# Patient Record
Sex: FEMALE | Race: WHITE | Employment: OTHER | URBAN - NONMETROPOLITAN AREA
[De-identification: names, ages, dates, MRNs, and addresses within clinical notes are randomized per-mention and may not be internally consistent; named-entity substitution may affect disease eponyms.]

---

## 2021-08-13 ENCOUNTER — HOSPITAL ENCOUNTER (INPATIENT)
Age: 61
LOS: 5 days | Discharge: HOME | DRG: 177 | End: 2021-08-18
Attending: EMERGENCY MEDICINE | Admitting: HOSPITALIST
Payer: COMMERCIAL

## 2021-08-13 ENCOUNTER — APPOINTMENT (OUTPATIENT)
Dept: GENERAL RADIOLOGY | Age: 61
DRG: 177 | End: 2021-08-13
Payer: COMMERCIAL

## 2021-08-13 DIAGNOSIS — R09.02 HYPOXIA: Primary | ICD-10-CM

## 2021-08-13 DIAGNOSIS — U07.1 COVID-19: ICD-10-CM

## 2021-08-13 PROBLEM — J96.01 ACUTE HYPOXEMIC RESPIRATORY FAILURE DUE TO SEVERE ACUTE RESPIRATORY SYNDROME CORONAVIRUS 2 (SARS-COV-2) DISEASE (HCC): Status: ACTIVE | Noted: 2021-08-13

## 2021-08-13 PROBLEM — E11.9 DM (DIABETES MELLITUS) (HCC): Status: ACTIVE | Noted: 2021-08-13

## 2021-08-13 PROBLEM — I10 HTN (HYPERTENSION): Status: ACTIVE | Noted: 2021-08-13

## 2021-08-13 LAB
ALBUMIN SERPL-MCNC: 3.9 G/DL (ref 3.5–5.2)
ALP BLD-CCNC: 91 U/L (ref 35–104)
ALT SERPL-CCNC: 55 U/L (ref 5–33)
ANION GAP SERPL CALCULATED.3IONS-SCNC: 12 MMOL/L (ref 7–19)
AST SERPL-CCNC: 75 U/L (ref 5–32)
BASOPHILS ABSOLUTE: 0 K/UL (ref 0–0.2)
BASOPHILS RELATIVE PERCENT: 0.2 % (ref 0–1)
BILIRUB SERPL-MCNC: 0.5 MG/DL (ref 0.2–1.2)
BUN BLDV-MCNC: 22 MG/DL (ref 8–23)
C-REACTIVE PROTEIN: 7.14 MG/DL (ref 0–0.5)
CALCIUM SERPL-MCNC: 9 MG/DL (ref 8.8–10.2)
CHLORIDE BLD-SCNC: 102 MMOL/L (ref 98–111)
CO2: 26 MMOL/L (ref 22–29)
CREAT SERPL-MCNC: 0.9 MG/DL (ref 0.5–0.9)
D DIMER: 0.8 UG/ML FEU (ref 0–0.48)
EOSINOPHILS ABSOLUTE: 0 K/UL (ref 0–0.6)
EOSINOPHILS RELATIVE PERCENT: 0 % (ref 0–5)
FERRITIN: >2000 NG/ML (ref 13–150)
FIBRINOGEN: 583 MG/DL (ref 238–505)
GFR AFRICAN AMERICAN: >59
GFR NON-AFRICAN AMERICAN: >60
GLUCOSE BLD-MCNC: 151 MG/DL (ref 74–109)
HBA1C MFR BLD: 6.7 % (ref 4–6)
HCT VFR BLD CALC: 42.5 % (ref 37–47)
HEMOGLOBIN: 13.3 G/DL (ref 12–16)
IMMATURE GRANULOCYTES #: 0 K/UL
INR BLD: 0.92 (ref 0.88–1.18)
LYMPHOCYTES ABSOLUTE: 1.3 K/UL (ref 1.1–4.5)
LYMPHOCYTES RELATIVE PERCENT: 20.9 % (ref 20–40)
MCH RBC QN AUTO: 28.9 PG (ref 27–31)
MCHC RBC AUTO-ENTMCNC: 31.3 G/DL (ref 33–37)
MCV RBC AUTO: 92.4 FL (ref 81–99)
MONOCYTES ABSOLUTE: 0.4 K/UL (ref 0–0.9)
MONOCYTES RELATIVE PERCENT: 6.2 % (ref 0–10)
NEUTROPHILS ABSOLUTE: 4.4 K/UL (ref 1.5–7.5)
NEUTROPHILS RELATIVE PERCENT: 72.4 % (ref 50–65)
PDW BLD-RTO: 12.9 % (ref 11.5–14.5)
PLATELET # BLD: 174 K/UL (ref 130–400)
PMV BLD AUTO: 11 FL (ref 9.4–12.3)
POTASSIUM REFLEX MAGNESIUM: 4.2 MMOL/L (ref 3.5–5)
PROCALCITONIN: 0.18 NG/ML (ref 0–0.09)
PROTHROMBIN TIME: 12.6 SEC (ref 12–14.6)
RBC # BLD: 4.6 M/UL (ref 4.2–5.4)
SARS-COV-2, NAAT: DETECTED
SODIUM BLD-SCNC: 140 MMOL/L (ref 136–145)
TOTAL PROTEIN: 7.4 G/DL (ref 6.6–8.7)
TROPONIN: <0.01 NG/ML (ref 0–0.03)
WBC # BLD: 6.1 K/UL (ref 4.8–10.8)

## 2021-08-13 PROCEDURE — 2700000000 HC OXYGEN THERAPY PER DAY

## 2021-08-13 PROCEDURE — 1210000000 HC MED SURG R&B

## 2021-08-13 PROCEDURE — 6370000000 HC RX 637 (ALT 250 FOR IP): Performed by: HOSPITALIST

## 2021-08-13 PROCEDURE — 87635 SARS-COV-2 COVID-19 AMP PRB: CPT

## 2021-08-13 PROCEDURE — 85025 COMPLETE CBC W/AUTO DIFF WBC: CPT

## 2021-08-13 PROCEDURE — 71045 X-RAY EXAM CHEST 1 VIEW: CPT

## 2021-08-13 PROCEDURE — 83036 HEMOGLOBIN GLYCOSYLATED A1C: CPT

## 2021-08-13 PROCEDURE — 85610 PROTHROMBIN TIME: CPT

## 2021-08-13 PROCEDURE — 82728 ASSAY OF FERRITIN: CPT

## 2021-08-13 PROCEDURE — 93005 ELECTROCARDIOGRAM TRACING: CPT | Performed by: EMERGENCY MEDICINE

## 2021-08-13 PROCEDURE — 80053 COMPREHEN METABOLIC PANEL: CPT

## 2021-08-13 PROCEDURE — 84145 PROCALCITONIN (PCT): CPT

## 2021-08-13 PROCEDURE — 85379 FIBRIN DEGRADATION QUANT: CPT

## 2021-08-13 PROCEDURE — 86140 C-REACTIVE PROTEIN: CPT

## 2021-08-13 PROCEDURE — 2580000003 HC RX 258: Performed by: HOSPITALIST

## 2021-08-13 PROCEDURE — 99283 EMERGENCY DEPT VISIT LOW MDM: CPT

## 2021-08-13 PROCEDURE — 84484 ASSAY OF TROPONIN QUANT: CPT

## 2021-08-13 PROCEDURE — 6360000002 HC RX W HCPCS: Performed by: HOSPITALIST

## 2021-08-13 PROCEDURE — 36415 COLL VENOUS BLD VENIPUNCTURE: CPT

## 2021-08-13 PROCEDURE — 6370000000 HC RX 637 (ALT 250 FOR IP): Performed by: NURSE PRACTITIONER

## 2021-08-13 PROCEDURE — 85384 FIBRINOGEN ACTIVITY: CPT

## 2021-08-13 RX ORDER — MECOBALAMIN 5000 MCG
5 TABLET,DISINTEGRATING ORAL NIGHTLY
Status: DISCONTINUED | OUTPATIENT
Start: 2021-08-13 | End: 2021-08-18 | Stop reason: HOSPADM

## 2021-08-13 RX ORDER — SODIUM CHLORIDE 0.9 % (FLUSH) 0.9 %
5-40 SYRINGE (ML) INJECTION EVERY 12 HOURS SCHEDULED
Status: DISCONTINUED | OUTPATIENT
Start: 2021-08-13 | End: 2021-08-18 | Stop reason: HOSPADM

## 2021-08-13 RX ORDER — ALBUTEROL SULFATE 90 UG/1
2 AEROSOL, METERED RESPIRATORY (INHALATION) EVERY 6 HOURS PRN
Status: DISCONTINUED | OUTPATIENT
Start: 2021-08-13 | End: 2021-08-18 | Stop reason: HOSPADM

## 2021-08-13 RX ORDER — VITAMIN B COMPLEX
2000 TABLET ORAL DAILY
Status: DISCONTINUED | OUTPATIENT
Start: 2021-08-14 | End: 2021-08-18 | Stop reason: HOSPADM

## 2021-08-13 RX ORDER — ASCORBIC ACID 500 MG
500 TABLET ORAL DAILY
Status: DISCONTINUED | OUTPATIENT
Start: 2021-08-14 | End: 2021-08-18 | Stop reason: HOSPADM

## 2021-08-13 RX ORDER — ONDANSETRON 2 MG/ML
4 INJECTION INTRAMUSCULAR; INTRAVENOUS EVERY 6 HOURS PRN
Status: DISCONTINUED | OUTPATIENT
Start: 2021-08-13 | End: 2021-08-18 | Stop reason: HOSPADM

## 2021-08-13 RX ORDER — ONDANSETRON 4 MG/1
4 TABLET, ORALLY DISINTEGRATING ORAL EVERY 8 HOURS PRN
Status: DISCONTINUED | OUTPATIENT
Start: 2021-08-13 | End: 2021-08-18 | Stop reason: HOSPADM

## 2021-08-13 RX ORDER — GUAIFENESIN/DEXTROMETHORPHAN 100-10MG/5
5 SYRUP ORAL EVERY 4 HOURS PRN
Status: DISCONTINUED | OUTPATIENT
Start: 2021-08-13 | End: 2021-08-18 | Stop reason: HOSPADM

## 2021-08-13 RX ORDER — 0.9 % SODIUM CHLORIDE 0.9 %
30 INTRAVENOUS SOLUTION INTRAVENOUS PRN
Status: DISCONTINUED | OUTPATIENT
Start: 2021-08-13 | End: 2021-08-18 | Stop reason: HOSPADM

## 2021-08-13 RX ORDER — SODIUM CHLORIDE 9 MG/ML
25 INJECTION, SOLUTION INTRAVENOUS PRN
Status: DISCONTINUED | OUTPATIENT
Start: 2021-08-13 | End: 2021-08-18 | Stop reason: HOSPADM

## 2021-08-13 RX ORDER — MECOBALAMIN 5000 MCG
5 TABLET,DISINTEGRATING ORAL NIGHTLY PRN
Status: DISCONTINUED | OUTPATIENT
Start: 2021-08-13 | End: 2021-08-18 | Stop reason: HOSPADM

## 2021-08-13 RX ORDER — FAMOTIDINE 20 MG/1
20 TABLET, FILM COATED ORAL 2 TIMES DAILY
Status: DISCONTINUED | OUTPATIENT
Start: 2021-08-13 | End: 2021-08-18 | Stop reason: HOSPADM

## 2021-08-13 RX ORDER — BUDESONIDE AND FORMOTEROL FUMARATE DIHYDRATE 160; 4.5 UG/1; UG/1
2 AEROSOL RESPIRATORY (INHALATION) 2 TIMES DAILY
Status: DISCONTINUED | OUTPATIENT
Start: 2021-08-13 | End: 2021-08-18 | Stop reason: HOSPADM

## 2021-08-13 RX ORDER — POLYETHYLENE GLYCOL 3350 17 G/17G
17 POWDER, FOR SOLUTION ORAL DAILY PRN
Status: DISCONTINUED | OUTPATIENT
Start: 2021-08-13 | End: 2021-08-18 | Stop reason: HOSPADM

## 2021-08-13 RX ORDER — ACETAMINOPHEN 650 MG/1
650 SUPPOSITORY RECTAL EVERY 6 HOURS PRN
Status: DISCONTINUED | OUTPATIENT
Start: 2021-08-13 | End: 2021-08-18 | Stop reason: HOSPADM

## 2021-08-13 RX ORDER — ACETAMINOPHEN 325 MG/1
650 TABLET ORAL EVERY 6 HOURS PRN
Status: DISCONTINUED | OUTPATIENT
Start: 2021-08-13 | End: 2021-08-18 | Stop reason: HOSPADM

## 2021-08-13 RX ORDER — SODIUM CHLORIDE 0.9 % (FLUSH) 0.9 %
5-40 SYRINGE (ML) INJECTION PRN
Status: DISCONTINUED | OUTPATIENT
Start: 2021-08-13 | End: 2021-08-18 | Stop reason: HOSPADM

## 2021-08-13 RX ORDER — CALCIUM CARBONATE 200(500)MG
500 TABLET,CHEWABLE ORAL 3 TIMES DAILY PRN
Status: DISCONTINUED | OUTPATIENT
Start: 2021-08-13 | End: 2021-08-18 | Stop reason: HOSPADM

## 2021-08-13 RX ORDER — PANTOPRAZOLE SODIUM 40 MG/1
40 TABLET, DELAYED RELEASE ORAL
Status: DISCONTINUED | OUTPATIENT
Start: 2021-08-14 | End: 2021-08-18 | Stop reason: HOSPADM

## 2021-08-13 RX ORDER — ZINC SULFATE 50(220)MG
50 CAPSULE ORAL DAILY
Status: DISCONTINUED | OUTPATIENT
Start: 2021-08-14 | End: 2021-08-18 | Stop reason: HOSPADM

## 2021-08-13 RX ADMIN — FAMOTIDINE 20 MG: 20 TABLET ORAL at 23:35

## 2021-08-13 RX ADMIN — SODIUM CHLORIDE, PRESERVATIVE FREE 10 ML: 5 INJECTION INTRAVENOUS at 23:37

## 2021-08-13 RX ADMIN — DEXAMETHASONE 6 MG: 4 TABLET ORAL at 23:35

## 2021-08-13 RX ADMIN — BUDESONIDE AND FORMOTEROL FUMARATE DIHYDRATE 2 PUFF: 160; 4.5 AEROSOL RESPIRATORY (INHALATION) at 23:36

## 2021-08-13 RX ADMIN — ENOXAPARIN SODIUM 30 MG: 30 INJECTION SUBCUTANEOUS at 23:36

## 2021-08-13 RX ADMIN — Medication 5 MG: at 23:35

## 2021-08-13 ASSESSMENT — ENCOUNTER SYMPTOMS
VOMITING: 0
SHORTNESS OF BREATH: 1
DIARRHEA: 0
WHEEZING: 0
SORE THROAT: 0
ABDOMINAL PAIN: 0
ABDOMINAL DISTENTION: 0
CONSTIPATION: 0
COUGH: 1
TROUBLE SWALLOWING: 0
SINUS PAIN: 0
NAUSEA: 1
BLOOD IN STOOL: 0

## 2021-08-14 ENCOUNTER — APPOINTMENT (OUTPATIENT)
Dept: CT IMAGING | Age: 61
DRG: 177 | End: 2021-08-14
Payer: COMMERCIAL

## 2021-08-14 PROBLEM — Z87.891 FORMER HEAVY CIGARETTE SMOKER (20-39 PER DAY): Status: ACTIVE | Noted: 2021-08-14

## 2021-08-14 LAB
ALBUMIN SERPL-MCNC: 3.8 G/DL (ref 3.5–5.2)
ALP BLD-CCNC: 89 U/L (ref 35–104)
ALT SERPL-CCNC: 54 U/L (ref 5–33)
ANION GAP SERPL CALCULATED.3IONS-SCNC: 13 MMOL/L (ref 7–19)
AST SERPL-CCNC: 71 U/L (ref 5–32)
BACTERIA: ABNORMAL /HPF
BASOPHILS ABSOLUTE: 0 K/UL (ref 0–0.2)
BASOPHILS RELATIVE PERCENT: 0 % (ref 0–1)
BILIRUB SERPL-MCNC: 0.5 MG/DL (ref 0.2–1.2)
BILIRUBIN URINE: NEGATIVE
BLOOD, URINE: ABNORMAL
BUN BLDV-MCNC: 22 MG/DL (ref 8–23)
C-REACTIVE PROTEIN: 6.49 MG/DL (ref 0–0.5)
CALCIUM SERPL-MCNC: 8.8 MG/DL (ref 8.8–10.2)
CHLORIDE BLD-SCNC: 102 MMOL/L (ref 98–111)
CLARITY: CLEAR
CO2: 24 MMOL/L (ref 22–29)
COLOR: YELLOW
CREAT SERPL-MCNC: 0.9 MG/DL (ref 0.5–0.9)
EKG P AXIS: 24 DEGREES
EKG P-R INTERVAL: 160 MS
EKG Q-T INTERVAL: 362 MS
EKG QRS DURATION: 78 MS
EKG QTC CALCULATION (BAZETT): 393 MS
EKG T AXIS: 38 DEGREES
EOSINOPHILS ABSOLUTE: 0 K/UL (ref 0–0.6)
EOSINOPHILS RELATIVE PERCENT: 0 % (ref 0–5)
EPITHELIAL CELLS, UA: 3 /HPF (ref 0–5)
GFR AFRICAN AMERICAN: >59
GFR NON-AFRICAN AMERICAN: >60
GLUCOSE BLD-MCNC: 198 MG/DL (ref 74–109)
GLUCOSE BLD-MCNC: 214 MG/DL (ref 70–99)
GLUCOSE BLD-MCNC: 255 MG/DL (ref 70–99)
GLUCOSE BLD-MCNC: 257 MG/DL (ref 70–99)
GLUCOSE URINE: =>1000 MG/DL
HCT VFR BLD CALC: 40.3 % (ref 37–47)
HEMOGLOBIN: 12.7 G/DL (ref 12–16)
HYALINE CASTS: 3 /HPF (ref 0–8)
IMMATURE GRANULOCYTES #: 0 K/UL
KETONES, URINE: NEGATIVE MG/DL
LEUKOCYTE ESTERASE, URINE: ABNORMAL
LYMPHOCYTES ABSOLUTE: 1 K/UL (ref 1.1–4.5)
LYMPHOCYTES RELATIVE PERCENT: 15.1 % (ref 20–40)
MCH RBC QN AUTO: 29 PG (ref 27–31)
MCHC RBC AUTO-ENTMCNC: 31.5 G/DL (ref 33–37)
MCV RBC AUTO: 92 FL (ref 81–99)
MONOCYTES ABSOLUTE: 0.2 K/UL (ref 0–0.9)
MONOCYTES RELATIVE PERCENT: 3.7 % (ref 0–10)
NEUTROPHILS ABSOLUTE: 5.1 K/UL (ref 1.5–7.5)
NEUTROPHILS RELATIVE PERCENT: 80.7 % (ref 50–65)
NITRITE, URINE: NEGATIVE
PDW BLD-RTO: 12.9 % (ref 11.5–14.5)
PERFORMED ON: ABNORMAL
PH UA: 5 (ref 5–8)
PLATELET # BLD: 169 K/UL (ref 130–400)
PMV BLD AUTO: 11 FL (ref 9.4–12.3)
POTASSIUM REFLEX MAGNESIUM: 5 MMOL/L (ref 3.5–5)
PROTEIN UA: 100 MG/DL
RBC # BLD: 4.38 M/UL (ref 4.2–5.4)
RBC UA: 4 /HPF (ref 0–4)
SODIUM BLD-SCNC: 139 MMOL/L (ref 136–145)
SPECIFIC GRAVITY UA: >=1.045 (ref 1–1.03)
TOTAL PROTEIN: 6.5 G/DL (ref 6.6–8.7)
UROBILINOGEN, URINE: 1 E.U./DL
WBC # BLD: 6.3 K/UL (ref 4.8–10.8)
WBC UA: 112 /HPF (ref 0–5)

## 2021-08-14 PROCEDURE — 6370000000 HC RX 637 (ALT 250 FOR IP): Performed by: NURSE PRACTITIONER

## 2021-08-14 PROCEDURE — 6360000002 HC RX W HCPCS: Performed by: HOSPITALIST

## 2021-08-14 PROCEDURE — 2580000003 HC RX 258: Performed by: NURSE PRACTITIONER

## 2021-08-14 PROCEDURE — 2700000000 HC OXYGEN THERAPY PER DAY

## 2021-08-14 PROCEDURE — 1210000000 HC MED SURG R&B

## 2021-08-14 PROCEDURE — 6370000000 HC RX 637 (ALT 250 FOR IP): Performed by: INTERNAL MEDICINE

## 2021-08-14 PROCEDURE — 36415 COLL VENOUS BLD VENIPUNCTURE: CPT

## 2021-08-14 PROCEDURE — 81001 URINALYSIS AUTO W/SCOPE: CPT

## 2021-08-14 PROCEDURE — 2580000003 HC RX 258: Performed by: HOSPITALIST

## 2021-08-14 PROCEDURE — 6360000004 HC RX CONTRAST MEDICATION: Performed by: INTERNAL MEDICINE

## 2021-08-14 PROCEDURE — 82947 ASSAY GLUCOSE BLOOD QUANT: CPT

## 2021-08-14 PROCEDURE — XW033E5 INTRODUCTION OF REMDESIVIR ANTI-INFECTIVE INTO PERIPHERAL VEIN, PERCUTANEOUS APPROACH, NEW TECHNOLOGY GROUP 5: ICD-10-PCS | Performed by: HOSPITALIST

## 2021-08-14 PROCEDURE — 6370000000 HC RX 637 (ALT 250 FOR IP): Performed by: HOSPITALIST

## 2021-08-14 PROCEDURE — 71275 CT ANGIOGRAPHY CHEST: CPT

## 2021-08-14 PROCEDURE — 86140 C-REACTIVE PROTEIN: CPT

## 2021-08-14 PROCEDURE — 93010 ELECTROCARDIOGRAM REPORT: CPT | Performed by: INTERNAL MEDICINE

## 2021-08-14 PROCEDURE — 80053 COMPREHEN METABOLIC PANEL: CPT

## 2021-08-14 PROCEDURE — 87086 URINE CULTURE/COLONY COUNT: CPT

## 2021-08-14 PROCEDURE — 85025 COMPLETE CBC W/AUTO DIFF WBC: CPT

## 2021-08-14 PROCEDURE — 2500000003 HC RX 250 WO HCPCS: Performed by: NURSE PRACTITIONER

## 2021-08-14 PROCEDURE — 87186 SC STD MICRODIL/AGAR DIL: CPT

## 2021-08-14 RX ORDER — LISINOPRIL 40 MG/1
40 TABLET ORAL DAILY
COMMUNITY

## 2021-08-14 RX ORDER — PRAVASTATIN SODIUM 40 MG
40 TABLET ORAL DAILY
COMMUNITY

## 2021-08-14 RX ORDER — INSULIN GLARGINE 100 [IU]/ML
10 INJECTION, SOLUTION SUBCUTANEOUS NIGHTLY
Status: DISCONTINUED | OUTPATIENT
Start: 2021-08-14 | End: 2021-08-15

## 2021-08-14 RX ORDER — NICOTINE POLACRILEX 4 MG
15 LOZENGE BUCCAL PRN
Status: DISCONTINUED | OUTPATIENT
Start: 2021-08-14 | End: 2021-08-18 | Stop reason: HOSPADM

## 2021-08-14 RX ORDER — GLIPIZIDE 5 MG/1
5 TABLET ORAL DAILY
COMMUNITY

## 2021-08-14 RX ORDER — DEXTROSE MONOHYDRATE 50 MG/ML
100 INJECTION, SOLUTION INTRAVENOUS PRN
Status: DISCONTINUED | OUTPATIENT
Start: 2021-08-14 | End: 2021-08-18 | Stop reason: HOSPADM

## 2021-08-14 RX ORDER — LISINOPRIL 10 MG/1
10 TABLET ORAL DAILY
Status: DISCONTINUED | OUTPATIENT
Start: 2021-08-14 | End: 2021-08-16

## 2021-08-14 RX ORDER — DEXTROSE MONOHYDRATE 25 G/50ML
12.5 INJECTION, SOLUTION INTRAVENOUS PRN
Status: DISCONTINUED | OUTPATIENT
Start: 2021-08-14 | End: 2021-08-18 | Stop reason: HOSPADM

## 2021-08-14 RX ADMIN — ZINC SULFATE 220 MG (50 MG) CAPSULE 50 MG: CAPSULE at 08:27

## 2021-08-14 RX ADMIN — PANTOPRAZOLE SODIUM 40 MG: 40 TABLET, DELAYED RELEASE ORAL at 08:28

## 2021-08-14 RX ADMIN — FAMOTIDINE 20 MG: 20 TABLET ORAL at 22:59

## 2021-08-14 RX ADMIN — OXYCODONE HYDROCHLORIDE AND ACETAMINOPHEN 500 MG: 500 TABLET ORAL at 08:27

## 2021-08-14 RX ADMIN — FAMOTIDINE 20 MG: 20 TABLET ORAL at 08:27

## 2021-08-14 RX ADMIN — LISINOPRIL 10 MG: 2.5 TABLET ORAL at 12:09

## 2021-08-14 RX ADMIN — ENOXAPARIN SODIUM 30 MG: 30 INJECTION SUBCUTANEOUS at 08:28

## 2021-08-14 RX ADMIN — SODIUM CHLORIDE, PRESERVATIVE FREE 10 ML: 5 INJECTION INTRAVENOUS at 08:28

## 2021-08-14 RX ADMIN — BUDESONIDE AND FORMOTEROL FUMARATE DIHYDRATE 2 PUFF: 160; 4.5 AEROSOL RESPIRATORY (INHALATION) at 08:25

## 2021-08-14 RX ADMIN — GUAIFENESIN AND DEXTROMETHORPHAN 5 ML: 100; 10 SYRUP ORAL at 08:35

## 2021-08-14 RX ADMIN — DEXAMETHASONE 6 MG: 4 TABLET ORAL at 16:54

## 2021-08-14 RX ADMIN — Medication 5 MG: at 23:00

## 2021-08-14 RX ADMIN — Medication 2000 UNITS: at 08:27

## 2021-08-14 RX ADMIN — ENOXAPARIN SODIUM 30 MG: 30 INJECTION SUBCUTANEOUS at 23:00

## 2021-08-14 RX ADMIN — BUDESONIDE AND FORMOTEROL FUMARATE DIHYDRATE 2 PUFF: 160; 4.5 AEROSOL RESPIRATORY (INHALATION) at 22:59

## 2021-08-14 RX ADMIN — SODIUM CHLORIDE, PRESERVATIVE FREE 10 ML: 5 INJECTION INTRAVENOUS at 22:59

## 2021-08-14 RX ADMIN — INSULIN GLARGINE 10 UNITS: 100 INJECTION, SOLUTION SUBCUTANEOUS at 23:06

## 2021-08-14 RX ADMIN — IOPAMIDOL 95 ML: 755 INJECTION, SOLUTION INTRAVENOUS at 09:14

## 2021-08-14 RX ADMIN — REMDESIVIR 200 MG: 100 INJECTION, POWDER, LYOPHILIZED, FOR SOLUTION INTRAVENOUS at 04:35

## 2021-08-14 ASSESSMENT — ENCOUNTER SYMPTOMS
ABDOMINAL PAIN: 0
COUGH: 1
VOMITING: 0
SHORTNESS OF BREATH: 1
COLOR CHANGE: 0
CHEST TIGHTNESS: 0
EYE PAIN: 0
DIARRHEA: 0
WHEEZING: 0
NAUSEA: 1

## 2021-08-14 NOTE — PROGRESS NOTES
[facility]      Patient:  Thom Bryan  YOB: 1960  Date of Service: 8/14/2021  MRN: [id number]   Acct: [de-identified]   Primary Care Physician: Se Gayle MD  Advance Directive: Full Code  Admit Date: 8/13/2021       Hospital Day: 1  Portions of this note have been copied forward, however, changed to reflect the most current clinical status of this patient. CHIEF COMPLAINT Shortness of breath, known Covid diagnosis    SUBJECTIVE:  Patient states she is feeling much better today. Reports improvement in shortness of breath. Denies chest pain. HOSPITAL COURSE:  The patient is a 56 y.o. female with past medical history of hypertension and diabetes who presented to [address] [place] [address] ED complaining of shortness of breath associated with COVID-19. Pt stated she tested positive for Covid on 8/4/2021. Reported developing shortness of breath 3 days prior to admission. Reported fever, chills, nonproductive cough, nausea, fatigue, weakness and decreased appetite. She was noted to be hypoxic in ED requiring 3 L oxygen per nasal cannula, denied home oxygen use. Work-up in ED revealed hypoxia on arrival, CRP of 7.14, ALT 55, AST 75, D-dimer 0.80, fibrinogen 583. Chest x-ray revealed patchy bilateral pneumonia consistent with Covid pneumonia. CTA negative for PE. Currently requiring 2 LPM via nasal cannula. Review of Systems:   Review of Systems   Constitutional: Positive for activity change, appetite change and fatigue. Negative for chills and fever. Respiratory: Positive for cough and shortness of breath. Negative for chest tightness and wheezing. Cardiovascular: Negative for chest pain and leg swelling. Gastrointestinal: Positive for nausea. Negative for abdominal pain and vomiting. Genitourinary: Negative for difficulty urinating and dysuria. Musculoskeletal: Negative for arthralgias and myalgias. Skin: Negative for color change. Neurological: Negative for dizziness and light-headedness. Psychiatric/Behavioral: Negative for confusion. 14 point review of systems is negative except as specifically addressed above. Objective:   VITALS:  BP (!) 168/77   Pulse 65   Temp 97.3 °F (36.3 °C)   Resp 16   Ht 5' 4\" (1.626 m)   Wt 154 lb 3 oz (69.9 kg)   SpO2 95%   BMI 26.47 kg/m²   24HR INTAKE/OUTPUT:    Intake/Output Summary (Last 24 hours) at 8/14/2021 1427  Last data filed at 8/14/2021 0030  Gross per 24 hour   Intake 200 ml   Output --   Net 200 ml       Physical Exam  Eyes:      Extraocular Movements: Extraocular movements intact. Pupils: Pupils are equal, round, and reactive to light. Cardiovascular:      Rate and Rhythm: Normal rate and regular rhythm. Pulses: Normal pulses. Heart sounds: Normal heart sounds. Pulmonary:      Effort: Pulmonary effort is normal.      Breath sounds: Wheezing present. Abdominal:      General: Abdomen is flat. Bowel sounds are normal. There is no distension. Palpations: Abdomen is soft. Tenderness: There is no abdominal tenderness. Musculoskeletal:         General: Normal range of motion. Cervical back: Normal range of motion and neck supple. Right lower leg: No edema. Left lower leg: No edema. Skin:     General: Skin is warm and dry. Capillary Refill: Capillary refill takes less than 2 seconds. Neurological:      General: No focal deficit present. Mental Status: She is alert and oriented to person, place, and time.          Medications:      dextrose      sodium chloride        lisinopril  10 mg Oral Daily    insulin lispro  0-6 Units Subcutaneous TID WC    insulin lispro  0-3 Units Subcutaneous Nightly    insulin glargine  10 Units Subcutaneous Nightly    sodium chloride flush  5-40 mL Intravenous 2 times per day    enoxaparin  30 mg Subcutaneous BID    dexamethasone  6 mg Oral Daily    Vitamin D  2,000 Units Oral Daily    pantoprazole  40 mg Oral QAM AC    ascorbic acid  500 mg Oral Daily    budesonide-formoterol  2 puff Inhalation BID    famotidine  20 mg Oral BID    melatonin  5 mg Oral Nightly    zinc sulfate  50 mg Oral Daily    [START ON 8/15/2021] remdesivir IVPB  100 mg Intravenous Q24H     glucose, dextrose, glucagon (rDNA), dextrose, sodium chloride flush, sodium chloride, ondansetron **OR** ondansetron, acetaminophen **OR** acetaminophen, guaiFENesin-dextromethorphan, polyethylene glycol, melatonin, calcium carbonate, albuterol sulfate HFA, sodium chloride  ADULT DIET; Regular     Lab and other Data:     Recent Labs     08/13/21 2036 08/14/21  0346   WBC 6.1 6.3   HGB 13.3 12.7    169     Recent Labs     08/13/21 2036 08/14/21 0346    139   K 4.2 5.0    102   CO2 26 24   BUN 22 22   CREATININE 0.9 0.9   GLUCOSE 151* 198*     Recent Labs     08/13/21 2036 08/14/21 0346   AST 75* 71*   ALT 55* 54*   BILITOT 0.5 0.5   ALKPHOS 91 89     Troponin T:   Recent Labs     08/13/21 2036   TROPONINI <0.01     Pro-BNP: No results for input(s): BNP in the last 72 hours. INR:   Recent Labs     08/13/21 2036   INR 0.92     UA:No results for input(s): NITRITE, COLORU, PHUR, LABCAST, WBCUA, RBCUA, MUCUS, TRICHOMONAS, YEAST, BACTERIA, CLARITYU, SPECGRAV, LEUKOCYTESUR, UROBILINOGEN, BILIRUBINUR, BLOODU, GLUCOSEU, AMORPHOUS in the last 72 hours. Invalid input(s): Sinan Brine  A1C:   Recent Labs     08/13/21 2036   LABA1C 6.7*     ABG:No results for input(s): PHART, GFU3YQS, PO2ART, IHE0BXY, BEART, HGBAE, S9ONZBXF, CARBOXHGBART in the last 72 hours. RAD:   XR CHEST PORTABLE    Result Date: 8/13/2021  . Patchy bilateral pneumonia. Signed by Dr Kathy Benitez    Result Date: 8/14/2021  1. No pulmonary emboli. 2. Scattered bilateral predominantly peripheral groundglass opacities consistent with multifocal pneumonia. The appearance is favorable COVID-19 pneumonia. 3. Mild cardiomegaly with scattered vascular calcification.  Signed by

## 2021-08-14 NOTE — ED PROVIDER NOTES
file.      CURRENT MEDICATIONS       There are no discharge medications for this patient. ALLERGIES     Patient has no known allergies. FAMILY HISTORY     No family history on file. SOCIAL HISTORY       Social History     Socioeconomic History    Marital status:      Spouse name: None    Number of children: None    Years of education: None    Highest education level: None   Occupational History    None   Tobacco Use    Smoking status: Unknown If Ever Smoked   Vaping Use    Vaping Use: Never used   Substance and Sexual Activity    Alcohol use: Not Currently    Drug use: None    Sexual activity: None   Other Topics Concern    None   Social History Narrative    None     Social Determinants of Health     Financial Resource Strain:     Difficulty of Paying Living Expenses:    Food Insecurity:     Worried About Running Out of Food in the Last Year:     Ran Out of Food in the Last Year:    Transportation Needs:     Lack of Transportation (Medical):      Lack of Transportation (Non-Medical):    Physical Activity:     Days of Exercise per Week:     Minutes of Exercise per Session:    Stress:     Feeling of Stress :    Social Connections:     Frequency of Communication with Friends and Family:     Frequency of Social Gatherings with Friends and Family:     Attends Mandaeism Services:     Active Member of Clubs or Organizations:     Attends Club or Organization Meetings:     Marital Status:    Intimate Partner Violence:     Fear of Current or Ex-Partner:     Emotionally Abused:     Physically Abused:     Sexually Abused:        SCREENINGS    Tanner Coma Scale  Eye Opening: Spontaneous  Best Verbal Response: Oriented  Best Motor Response: Obeys commands  Tanner Coma Scale Score: 15        PHYSICAL EXAM    (up to 7 for level 4, 8 or more for level 5)     ED Triage Vitals [08/13/21 1939]   BP Temp Temp src Pulse Resp SpO2 Height Weight   124/74 99.3 °F (37.4 °C) -- 108 18 (!) 88 % -- 140 lb (63.5 kg)       Physical Exam  Vitals reviewed. Constitutional:       General: She is not in acute distress. Appearance: She is well-developed. HENT:      Head: Normocephalic and atraumatic. Mouth/Throat:      Mouth: Mucous membranes are moist.   Eyes:      General: No scleral icterus. Pupils: Pupils are equal, round, and reactive to light. Neck:      Vascular: No JVD. Cardiovascular:      Rate and Rhythm: Normal rate and regular rhythm. Heart sounds: Normal heart sounds. Pulmonary:      Effort: Pulmonary effort is normal. No respiratory distress. Breath sounds: Rhonchi present. Abdominal:      General: There is no distension. Palpations: Abdomen is soft. Tenderness: There is no abdominal tenderness. Musculoskeletal:         General: No tenderness. Cervical back: Normal range of motion and neck supple. Right lower leg: No edema. Left lower leg: No edema. Skin:     General: Skin is warm and dry. Capillary Refill: Capillary refill takes less than 2 seconds. Neurological:      General: No focal deficit present. Mental Status: She is alert and oriented to person, place, and time. Psychiatric:         Mood and Affect: Mood normal.         Behavior: Behavior normal.         DIAGNOSTIC RESULTS     EKG: All EKG's are interpreted by the Emergency Department Physician who either signs or Co-signs this chart in the absence of a cardiologist.    Sinus rhythm. Normal QT. No signs of acute ischemia    RADIOLOGY:   Non-plain film images such as CT, Ultrasound and MRI are read by the radiologist. Plainradiographic images are visualized and preliminarily interpreted by the emergency physician with the below findings:    Interpretation per the Radiologist below, if available at the time of this note:    CTA PULMONARY W CONTRAST   Final Result   1. No pulmonary emboli.    2. Scattered bilateral predominantly peripheral groundglass opacities consistent with multifocal pneumonia. The appearance is favorable   COVID-19 pneumonia. 3. Mild cardiomegaly with scattered vascular calcification. Signed by Dr Luzmaria Frye   Final Result   . Patchy bilateral pneumonia.    Signed by Dr Jefferson Johnson:  Labs Reviewed   COVID-19, RAPID - Abnormal; Notable for the following components:       Result Value    SARS-CoV-2, NAAT DETECTED (*)     All other components within normal limits    Narrative:     Ira Chambers tel. ,  Microbiology results called to and read back by ilan sun rn er,  08/13/2021 21:02, by [address] - Abnormal; Notable for the following components:    MCHC 31.3 (*)     Neutrophils % 72.4 (*)     All other components within normal limits   COMPREHENSIVE METABOLIC PANEL W/ REFLEX TO MG FOR LOW K - Abnormal; Notable for the following components:    Glucose 151 (*)     ALT 55 (*)     AST 75 (*)     All other components within normal limits   FIBRINOGEN - Abnormal; Notable for the following components:    Fibrinogen 583 (*)     All other components within normal limits   PROCALCITONIN - Abnormal; Notable for the following components:    Procalcitonin 0.18 (*)     All other components within normal limits   C-REACTIVE PROTEIN - Abnormal; Notable for the following components:    CRP 7.14 (*)     All other components within normal limits   FERRITIN - Abnormal; Notable for the following components:    Ferritin >2,000.0 (*)     All other components within normal limits   D-DIMER, QUANTITATIVE - Abnormal; Notable for the following components:    D-Dimer, Quant 0.80 (*)     All other components within normal limits   HEMOGLOBIN A1C - Abnormal; Notable for the following components:    Hemoglobin A1C 6.7 (*)     All other components within normal limits   CBC WITH AUTO DIFFERENTIAL - Abnormal; Notable for the following components:    MCHC 31.5 (*)     Neutrophils % 80.7 (*)     Lymphocytes % 15.1 (*)     Lymphocytes Absolute 1.0 (*)     All other components within normal limits   COMPREHENSIVE METABOLIC PANEL W/ REFLEX TO MG FOR LOW K - Abnormal; Notable for the following components:    Glucose 198 (*)     Total Protein 6.5 (*)     ALT 54 (*)     AST 71 (*)     All other components within normal limits   C-REACTIVE PROTEIN - Abnormal; Notable for the following components:    CRP 6.49 (*)     All other components within normal limits   POCT GLUCOSE - Abnormal; Notable for the following components:    POC Glucose 255 (*)     All other components within normal limits   PROTIME-INR   TROPONIN   URINE RT REFLEX TO CULTURE   POCT GLUCOSE   POCT GLUCOSE   POCT GLUCOSE       All other labs were within normal range or not returned as of this dictation. EMERGENCY DEPARTMENT COURSE and DIFFERENTIALDIAGNOSIS/MDM:   Vitals:    Vitals:    08/13/21 2200 08/14/21 0030 08/14/21 0430 08/14/21 0722   BP: (!) 167/67 (!) 149/56 (!) 145/72 (!) 168/77   Pulse: 80 78 73 65   Resp: 16 18 18 16   Temp: 98.9 °F (37.2 °C) 98.9 °F (37.2 °C) 96.7 °F (35.9 °C) 97.3 °F (36.3 °C)   SpO2: 96% 94% 94% 95%   Weight: 154 lb 3 oz (69.9 kg) 154 lb 3 oz (69.9 kg)     Height: 5' 4\" (1.626 m)          MDM  O2 sats mid 80s on room air. Improved to 90s on nasal cannula. Patient resting comfortably no distress. Patient's case discussed with Dr. Elena Dai, hospitalist, who will admit. Patient updated about plan. CONSULTS:  IP CONSULT TO PHARMACY    PROCEDURES:  Unless otherwise notedbelow, none     Procedures    FINAL IMPRESSION     1. Hypoxia    2. COVID-19          DISPOSITION/PLAN   DISPOSITION Admitted 08/13/2021 08:06:46 PM      PATIENT REFERRED TO:  @FUP@    DISCHARGE MEDICATIONS:  There are no discharge medications for this patient.          (Please note that portions of this note were completed with a voice recognition program.  Efforts were made to edit the dictations butoccasionally words are mis-transcribed.)    Hany De Los Santos Dov Cleary MD (electronically signed)  AttendingEmergency Physician         Tonie Stewart MD  08/14/21 7714

## 2021-08-14 NOTE — H&P
[facility] Hospitalists      Hospitalist - History & Physical      PCP: Issa Lafleur MD    Date of Admission: 8/13/2021    Date of Service: 8/13/2021    Chief Complaint: Positive for Covid, shortness of breath    History Of Present Illness: The patient is a 56 y.o. female with past medical history of hypertension and diabetes who presented to [facility] ED complaining of shortness of breath associated with COVID-19. Ms. Xiomara Mckinnon states she was tested positive for Covid on 8/4/2021. Started developing shortness of breath since last 3 days. Reports fever, chills, nonproductive cough, nausea, fatigue, weakness and decreased appetite. She was noted to be hypoxic in ED requiring 3 L oxygen per nasal cannula, denies home oxygen use. Work-up in ED revealed hypoxia on arrival, CRP of 7.14, ALT 55, AST 75, D-dimer 0.80, fibrinogen 583. Chest x-ray revealed patchy bilateral pneumonia consistent with Covid pneumonia. Past Medical History:        Diagnosis Date    DM (diabetes mellitus) ([address]     HTN (hypertension)        Past Surgical History:    No past surgical history on file. Home Medications:  Prior to Admission medications    Not on File       Allergies:    Patient has no known allergies. Social History:    The patient currently lives at home  Tobacco:   has no history on file for tobacco use. Alcohol:   has no history on file for alcohol use. Illicit Drugs: denies    Family History:  No family history on file. Review of Systems   Constitutional: Positive for appetite change, chills, fatigue and fever. Negative for diaphoresis. HENT: Negative for ear pain, sinus pain, sore throat and trouble swallowing. Eyes: Negative for visual disturbance. Respiratory: Positive for cough and shortness of breath. Negative for wheezing. Cardiovascular: Negative for chest pain, palpitations and leg swelling. Gastrointestinal: Positive for nausea.  Negative for abdominal distention, abdominal pain, blood in stool, constipation, diarrhea and vomiting. Endocrine: Negative for cold intolerance and heat intolerance. Genitourinary: Negative for difficulty urinating, flank pain, frequency and urgency. Musculoskeletal: Negative for arthralgias and myalgias. Neurological: Positive for weakness. Negative for dizziness, syncope, light-headedness, numbness and headaches. Hematological: Does not bruise/bleed easily. Psychiatric/Behavioral: Negative for agitation, confusion and dysphoric mood. Physical Examination:  /76   Pulse 98   Temp 99.3 °F (37.4 °C)   Resp 20   Wt 140 lb (63.5 kg)   SpO2 94%     Physical Exam  Constitutional:       General: She is not in acute distress. Appearance: Normal appearance. She is not toxic-appearing or diaphoretic. HENT:      Head: Normocephalic and atraumatic. Right Ear: External ear normal.      Left Ear: External ear normal.      Nose: Nose normal. No congestion or rhinorrhea. Mouth/Throat:      Mouth: Mucous membranes are moist.      Pharynx: Oropharynx is clear. Eyes:      General: No scleral icterus. Extraocular Movements: Extraocular movements intact. Conjunctiva/sclera: Conjunctivae normal.   Cardiovascular:      Rate and Rhythm: Normal rate and regular rhythm. Pulses: Normal pulses. Heart sounds: Normal heart sounds. No murmur heard. No friction rub. No gallop. Pulmonary:      Effort: Pulmonary effort is normal. No respiratory distress. Breath sounds: No wheezing, rhonchi or rales. Comments: Diminished breath sounds bilaterally  Abdominal:      General: Abdomen is flat. Bowel sounds are normal. There is no distension. Palpations: Abdomen is soft. Tenderness: There is no abdominal tenderness. Musculoskeletal:         General: No swelling. Normal range of motion. Cervical back: Normal range of motion and neck supple. Right lower leg: No edema. Left lower leg: No edema. (hypertension)- noted, resume home medications once verified       DM (diabetes mellitus) ([facility]- HgA1c, SSI, Accu-Chek, hypoglycemia treatment protocol in place        DVT Prophylaxis: Lovenox    Further Orders per Clinical course/attending. Signed:  Electronically signed by PHILIP Choudhary CNP on 8/13/21 at 8:43 PM CDT       EMR Dragon/Transcription disclaimer:   Much of this encounter note is an electronic transcription/translation of spoken language to printed text.  The electronic translation of spoken language may permit erroneous, or at times, nonsensical words or phrases to be inadvertently transcribed; although attempts have made to review the note for such errors, some may still exist.

## 2021-08-15 LAB
ALBUMIN SERPL-MCNC: 3.6 G/DL (ref 3.5–5.2)
ALP BLD-CCNC: 82 U/L (ref 35–104)
ALT SERPL-CCNC: 54 U/L (ref 5–33)
ANION GAP SERPL CALCULATED.3IONS-SCNC: 10 MMOL/L (ref 7–19)
AST SERPL-CCNC: 53 U/L (ref 5–32)
BASOPHILS ABSOLUTE: 0 K/UL (ref 0–0.2)
BASOPHILS RELATIVE PERCENT: 0 % (ref 0–1)
BILIRUB SERPL-MCNC: 0.3 MG/DL (ref 0.2–1.2)
BUN BLDV-MCNC: 34 MG/DL (ref 8–23)
C-REACTIVE PROTEIN: 4.41 MG/DL (ref 0–0.5)
CALCIUM SERPL-MCNC: 8.9 MG/DL (ref 8.8–10.2)
CHLORIDE BLD-SCNC: 102 MMOL/L (ref 98–111)
CO2: 28 MMOL/L (ref 22–29)
CREAT SERPL-MCNC: 0.9 MG/DL (ref 0.5–0.9)
EOSINOPHILS ABSOLUTE: 0 K/UL (ref 0–0.6)
EOSINOPHILS RELATIVE PERCENT: 0 % (ref 0–5)
GFR AFRICAN AMERICAN: >59
GFR NON-AFRICAN AMERICAN: >60
GLUCOSE BLD-MCNC: 166 MG/DL (ref 70–99)
GLUCOSE BLD-MCNC: 168 MG/DL (ref 70–99)
GLUCOSE BLD-MCNC: 276 MG/DL (ref 70–99)
GLUCOSE BLD-MCNC: 297 MG/DL (ref 74–109)
GLUCOSE BLD-MCNC: 304 MG/DL (ref 70–99)
HCT VFR BLD CALC: 39.7 % (ref 37–47)
HEMOGLOBIN: 12.6 G/DL (ref 12–16)
IMMATURE GRANULOCYTES #: 0 K/UL
LYMPHOCYTES ABSOLUTE: 1 K/UL (ref 1.1–4.5)
LYMPHOCYTES RELATIVE PERCENT: 24 % (ref 20–40)
MCH RBC QN AUTO: 28.8 PG (ref 27–31)
MCHC RBC AUTO-ENTMCNC: 31.7 G/DL (ref 33–37)
MCV RBC AUTO: 90.8 FL (ref 81–99)
MONOCYTES ABSOLUTE: 0.2 K/UL (ref 0–0.9)
MONOCYTES RELATIVE PERCENT: 5 % (ref 0–10)
NEUTROPHILS ABSOLUTE: 2.9 K/UL (ref 1.5–7.5)
NEUTROPHILS RELATIVE PERCENT: 70.5 % (ref 50–65)
PDW BLD-RTO: 12.8 % (ref 11.5–14.5)
PERFORMED ON: ABNORMAL
PLATELET # BLD: 198 K/UL (ref 130–400)
PMV BLD AUTO: 11.2 FL (ref 9.4–12.3)
POTASSIUM REFLEX MAGNESIUM: 5.4 MMOL/L (ref 3.5–5)
PROCALCITONIN: 0.12 NG/ML (ref 0–0.09)
RBC # BLD: 4.37 M/UL (ref 4.2–5.4)
SODIUM BLD-SCNC: 140 MMOL/L (ref 136–145)
TOTAL PROTEIN: 6.4 G/DL (ref 6.6–8.7)
WBC # BLD: 4.2 K/UL (ref 4.8–10.8)

## 2021-08-15 PROCEDURE — 86140 C-REACTIVE PROTEIN: CPT

## 2021-08-15 PROCEDURE — 6360000002 HC RX W HCPCS: Performed by: HOSPITALIST

## 2021-08-15 PROCEDURE — 2580000003 HC RX 258: Performed by: HOSPITALIST

## 2021-08-15 PROCEDURE — 1210000000 HC MED SURG R&B

## 2021-08-15 PROCEDURE — 6360000002 HC RX W HCPCS: Performed by: INTERNAL MEDICINE

## 2021-08-15 PROCEDURE — 2580000003 HC RX 258: Performed by: NURSE PRACTITIONER

## 2021-08-15 PROCEDURE — 6370000000 HC RX 637 (ALT 250 FOR IP): Performed by: NURSE PRACTITIONER

## 2021-08-15 PROCEDURE — 6370000000 HC RX 637 (ALT 250 FOR IP): Performed by: INTERNAL MEDICINE

## 2021-08-15 PROCEDURE — 84145 PROCALCITONIN (PCT): CPT

## 2021-08-15 PROCEDURE — 82947 ASSAY GLUCOSE BLOOD QUANT: CPT

## 2021-08-15 PROCEDURE — 2500000003 HC RX 250 WO HCPCS: Performed by: NURSE PRACTITIONER

## 2021-08-15 PROCEDURE — 85025 COMPLETE CBC W/AUTO DIFF WBC: CPT

## 2021-08-15 PROCEDURE — 6370000000 HC RX 637 (ALT 250 FOR IP): Performed by: HOSPITALIST

## 2021-08-15 PROCEDURE — 2580000003 HC RX 258: Performed by: INTERNAL MEDICINE

## 2021-08-15 PROCEDURE — 36415 COLL VENOUS BLD VENIPUNCTURE: CPT

## 2021-08-15 PROCEDURE — 80053 COMPREHEN METABOLIC PANEL: CPT

## 2021-08-15 PROCEDURE — 2700000000 HC OXYGEN THERAPY PER DAY

## 2021-08-15 RX ORDER — INSULIN GLARGINE 100 [IU]/ML
15 INJECTION, SOLUTION SUBCUTANEOUS NIGHTLY
Status: DISCONTINUED | OUTPATIENT
Start: 2021-08-15 | End: 2021-08-16

## 2021-08-15 RX ORDER — SODIUM CHLORIDE 9 MG/ML
INJECTION, SOLUTION INTRAVENOUS CONTINUOUS
Status: DISCONTINUED | OUTPATIENT
Start: 2021-08-15 | End: 2021-08-18 | Stop reason: HOSPADM

## 2021-08-15 RX ORDER — SODIUM POLYSTYRENE SULFONATE 15 G/60ML
15 SUSPENSION ORAL; RECTAL EVERY 6 HOURS
Status: COMPLETED | OUTPATIENT
Start: 2021-08-15 | End: 2021-08-15

## 2021-08-15 RX ADMIN — OXYCODONE HYDROCHLORIDE AND ACETAMINOPHEN 500 MG: 500 TABLET ORAL at 09:12

## 2021-08-15 RX ADMIN — BUDESONIDE AND FORMOTEROL FUMARATE DIHYDRATE 2 PUFF: 160; 4.5 AEROSOL RESPIRATORY (INHALATION) at 09:11

## 2021-08-15 RX ADMIN — SODIUM CHLORIDE: 9 INJECTION, SOLUTION INTRAVENOUS at 18:08

## 2021-08-15 RX ADMIN — FAMOTIDINE 20 MG: 20 TABLET ORAL at 20:43

## 2021-08-15 RX ADMIN — SODIUM CHLORIDE: 9 INJECTION, SOLUTION INTRAVENOUS at 09:16

## 2021-08-15 RX ADMIN — SODIUM CHLORIDE, PRESERVATIVE FREE 10 ML: 5 INJECTION INTRAVENOUS at 20:42

## 2021-08-15 RX ADMIN — INSULIN GLARGINE 15 UNITS: 100 INJECTION, SOLUTION SUBCUTANEOUS at 20:43

## 2021-08-15 RX ADMIN — ENOXAPARIN SODIUM 30 MG: 30 INJECTION SUBCUTANEOUS at 20:43

## 2021-08-15 RX ADMIN — SODIUM POLYSTYRENE SULFONATE 15 G: 15 SUSPENSION ORAL; RECTAL at 14:25

## 2021-08-15 RX ADMIN — SODIUM CHLORIDE, PRESERVATIVE FREE 1000 MG: 5 INJECTION INTRAVENOUS at 09:12

## 2021-08-15 RX ADMIN — LISINOPRIL 10 MG: 2.5 TABLET ORAL at 09:12

## 2021-08-15 RX ADMIN — REMDESIVIR 100 MG: 100 INJECTION, POWDER, LYOPHILIZED, FOR SOLUTION INTRAVENOUS at 06:37

## 2021-08-15 RX ADMIN — SODIUM CHLORIDE, PRESERVATIVE FREE 10 ML: 5 INJECTION INTRAVENOUS at 09:11

## 2021-08-15 RX ADMIN — Medication 2000 UNITS: at 09:12

## 2021-08-15 RX ADMIN — SODIUM CHLORIDE: 9 INJECTION, SOLUTION INTRAVENOUS at 20:42

## 2021-08-15 RX ADMIN — Medication 5 MG: at 20:43

## 2021-08-15 RX ADMIN — DEXAMETHASONE 6 MG: 4 TABLET ORAL at 17:35

## 2021-08-15 RX ADMIN — BUDESONIDE AND FORMOTEROL FUMARATE DIHYDRATE 2 PUFF: 160; 4.5 AEROSOL RESPIRATORY (INHALATION) at 20:43

## 2021-08-15 RX ADMIN — FAMOTIDINE 20 MG: 20 TABLET ORAL at 09:14

## 2021-08-15 RX ADMIN — ENOXAPARIN SODIUM 30 MG: 30 INJECTION SUBCUTANEOUS at 09:12

## 2021-08-15 RX ADMIN — SODIUM POLYSTYRENE SULFONATE 15 G: 15 SUSPENSION ORAL; RECTAL at 09:13

## 2021-08-15 RX ADMIN — PANTOPRAZOLE SODIUM 40 MG: 40 TABLET, DELAYED RELEASE ORAL at 06:38

## 2021-08-15 RX ADMIN — ZINC SULFATE 220 MG (50 MG) CAPSULE 50 MG: CAPSULE at 09:12

## 2021-08-15 ASSESSMENT — ENCOUNTER SYMPTOMS
WHEEZING: 0
ABDOMINAL PAIN: 0
CHEST TIGHTNESS: 0
VOMITING: 0
COUGH: 1
NAUSEA: 0
SHORTNESS OF BREATH: 1
COLOR CHANGE: 0

## 2021-08-15 NOTE — PROGRESS NOTES
[facility] Hospitalists      Patient:  Jumana Wyatt  YOB: 1960  Date of Service: 8/15/2021  MRN: [id number]   Acct: [de-identified]   Primary Care Physician: Veronica Edmondson MD  Advance Directive: Full Code  Admit Date: 8/13/2021       Hospital Day: 2  Portions of this note have been copied forward, however, changed to reflect the most current clinical status of this patient. CHIEF COMPLAINT shortness of breath, known covid    SUBJECTIVE:  Pt states she is feeling much better today. Reports shortness of breath has improved. States she has been able to get up the the chair today. HOSPITAL COURSE:  The patient is a 57 y. o. female with past medical history of hypertension and diabetes who presented to [facility] ED complaining of shortness of breath associated with COVID-19.  Pt stated she tested positive for Covid on 8/4/2021.  Reported developing shortness of breath 3 days prior to admission.  Reported fever, chills, nonproductive cough, nausea, fatigue, weakness and decreased appetite.  She was noted to be hypoxic in ED requiring 3 L oxygen per nasal cannula, denied home oxygen use.  Work-up in ED revealed hypoxia on arrival, CRP of 7.14, ALT 55, AST 75, D-dimer 0.80, fibrinogen 583.  Chest x-ray revealed patchy bilateral pneumonia consistent with Covid pneumonia. CTA negative for PE. Requiring 2 LPM via nasal cannula. Receiving remdesivir and decadron. No new complaints. Review of Systems:   Review of Systems   Constitutional: Positive for fatigue. Negative for activity change, appetite change, chills and fever. Respiratory: Positive for cough and shortness of breath. Negative for chest tightness and wheezing. Cardiovascular: Negative for chest pain and leg swelling. Gastrointestinal: Negative for abdominal pain, nausea and vomiting. Genitourinary: Negative for difficulty urinating and dysuria. Musculoskeletal: Negative for arthralgias and myalgias. Skin: Negative for color change. Neurological: Negative for dizziness and light-headedness. Psychiatric/Behavioral: Negative for confusion. 14 point review of systems is negative except as specifically addressed above. Objective:   VITALS:  BP (!) 156/66   Pulse 68   Temp 96.8 °F (36 °C) (Temporal)   Resp 18   Ht 5' 4\" (1.626 m)   Wt 154 lb 3 oz (69.9 kg)   SpO2 92%   BMI 26.47 kg/m²   24HR INTAKE/OUTPUT:    Intake/Output Summary (Last 24 hours) at 8/15/2021 1341  Last data filed at 8/15/2021 1100  Gross per 24 hour   Intake 120 ml   Output --   Net 120 ml       Physical Exam  Constitutional:       Appearance: Normal appearance. Eyes:      Extraocular Movements: Extraocular movements intact. Pupils: Pupils are equal, round, and reactive to light. Cardiovascular:      Rate and Rhythm: Normal rate and regular rhythm. Pulses: Normal pulses. Heart sounds: Normal heart sounds. Pulmonary:      Effort: Pulmonary effort is normal.      Breath sounds: No wheezing. Abdominal:      General: Abdomen is flat. Bowel sounds are normal. There is no distension. Palpations: Abdomen is soft. Tenderness: There is no abdominal tenderness. Musculoskeletal:         General: Normal range of motion. Cervical back: Normal range of motion and neck supple. Right lower leg: No edema. Left lower leg: No edema. Skin:     General: Skin is warm and dry. Capillary Refill: Capillary refill takes less than 2 seconds. Neurological:      General: No focal deficit present. Mental Status: She is alert and oriented to person, place, and time.          Medications:      sodium chloride 125 mL/hr at 08/15/21 0916    dextrose      sodium chloride        sodium polystyrene  15 g Oral Q6H    cefTRIAXone (ROCEPHIN) IV  1,000 mg Intravenous Q24H    insulin glargine  15 Units Subcutaneous Nightly    insulin lispro  5 Units Subcutaneous TID     lisinopril  10 mg Oral Daily    insulin lispro  0-6 Units 8/13/2021  . Patchy bilateral pneumonia. Signed by Dr Kelin Marie    Result Date: 8/14/2021  1. No pulmonary emboli. 2. Scattered bilateral predominantly peripheral groundglass opacities consistent with multifocal pneumonia. The appearance is favorable COVID-19 pneumonia. 3. Mild cardiomegaly with scattered vascular calcification.  Signed by Dr Paramjit Araya           Assessment/Plan   Principal Problem:    Acute hypoxemic respiratory failure due to severe acute respiratory syndrome coronavirus 2 (SARS-CoV-2) disease ([facility]  - Covid adjuvant therapy with zinc, vitamin C, vitamin D, Pepcid, melatonin              - Decadron 6 mg p.o. daily for 10 days               - Robitussin as needed              - Bronchodilators              - Incentive spirometry              - Encourage deep breathing and cough              - Supplemental oxygen as needed              - Droplet plus isolation              - remdesivir therapy day 3/5              -continue supportive care                     -home oxygen evaluation before discharge   -Lovenox      Active Problems:    HTN (hypertension)              -monitor, resumed home medications       DM (diabetes mellitus) ([facility]              -accus   -increased basal insulin              -SSI              -hypoglycemia protocol              -hold oral agents at this time       Former heavy cigarette smoker (20-39 per day)              -noted        DVT Prophylaxis: Lovenox       Mallie Jeans, APRN - CNP, 8/15/2021 1:41 PM

## 2021-08-16 LAB
ALBUMIN SERPL-MCNC: 3.4 G/DL (ref 3.5–5.2)
ALP BLD-CCNC: 71 U/L (ref 35–104)
ALT SERPL-CCNC: 75 U/L (ref 5–33)
ANION GAP SERPL CALCULATED.3IONS-SCNC: 13 MMOL/L (ref 7–19)
AST SERPL-CCNC: 72 U/L (ref 5–32)
BASOPHILS ABSOLUTE: 0 K/UL (ref 0–0.2)
BASOPHILS RELATIVE PERCENT: 0 % (ref 0–1)
BILIRUB SERPL-MCNC: 0.3 MG/DL (ref 0.2–1.2)
BUN BLDV-MCNC: 28 MG/DL (ref 8–23)
CALCIUM SERPL-MCNC: 8.4 MG/DL (ref 8.8–10.2)
CHLORIDE BLD-SCNC: 104 MMOL/L (ref 98–111)
CO2: 26 MMOL/L (ref 22–29)
CREAT SERPL-MCNC: 0.7 MG/DL (ref 0.5–0.9)
EOSINOPHILS ABSOLUTE: 0 K/UL (ref 0–0.6)
EOSINOPHILS RELATIVE PERCENT: 0 % (ref 0–5)
GFR AFRICAN AMERICAN: >59
GFR NON-AFRICAN AMERICAN: >60
GLUCOSE BLD-MCNC: 119 MG/DL (ref 70–99)
GLUCOSE BLD-MCNC: 163 MG/DL (ref 70–99)
GLUCOSE BLD-MCNC: 231 MG/DL (ref 70–99)
GLUCOSE BLD-MCNC: 254 MG/DL (ref 74–109)
GLUCOSE BLD-MCNC: 306 MG/DL (ref 70–99)
HCT VFR BLD CALC: 38.9 % (ref 37–47)
HEMOGLOBIN: 12.3 G/DL (ref 12–16)
IMMATURE GRANULOCYTES #: 0 K/UL
LYMPHOCYTES ABSOLUTE: 1 K/UL (ref 1.1–4.5)
LYMPHOCYTES RELATIVE PERCENT: 13 % (ref 20–40)
MCH RBC QN AUTO: 29 PG (ref 27–31)
MCHC RBC AUTO-ENTMCNC: 31.6 G/DL (ref 33–37)
MCV RBC AUTO: 91.7 FL (ref 81–99)
MONOCYTES ABSOLUTE: 0.3 K/UL (ref 0–0.9)
MONOCYTES RELATIVE PERCENT: 3.8 % (ref 0–10)
NEUTROPHILS ABSOLUTE: 6.3 K/UL (ref 1.5–7.5)
NEUTROPHILS RELATIVE PERCENT: 82.8 % (ref 50–65)
ORGANISM: ABNORMAL
PDW BLD-RTO: 12.8 % (ref 11.5–14.5)
PERFORMED ON: ABNORMAL
PLATELET # BLD: 223 K/UL (ref 130–400)
PMV BLD AUTO: 11.6 FL (ref 9.4–12.3)
POTASSIUM REFLEX MAGNESIUM: 4.5 MMOL/L (ref 3.5–5)
RBC # BLD: 4.24 M/UL (ref 4.2–5.4)
SODIUM BLD-SCNC: 143 MMOL/L (ref 136–145)
TOTAL PROTEIN: 6.3 G/DL (ref 6.6–8.7)
URINE CULTURE, ROUTINE: ABNORMAL
URINE CULTURE, ROUTINE: ABNORMAL
WBC # BLD: 7.6 K/UL (ref 4.8–10.8)

## 2021-08-16 PROCEDURE — 85025 COMPLETE CBC W/AUTO DIFF WBC: CPT

## 2021-08-16 PROCEDURE — 82947 ASSAY GLUCOSE BLOOD QUANT: CPT

## 2021-08-16 PROCEDURE — 2700000000 HC OXYGEN THERAPY PER DAY

## 2021-08-16 PROCEDURE — 36415 COLL VENOUS BLD VENIPUNCTURE: CPT

## 2021-08-16 PROCEDURE — 2500000003 HC RX 250 WO HCPCS: Performed by: NURSE PRACTITIONER

## 2021-08-16 PROCEDURE — 6370000000 HC RX 637 (ALT 250 FOR IP): Performed by: HOSPITALIST

## 2021-08-16 PROCEDURE — 80053 COMPREHEN METABOLIC PANEL: CPT

## 2021-08-16 PROCEDURE — 6370000000 HC RX 637 (ALT 250 FOR IP): Performed by: INTERNAL MEDICINE

## 2021-08-16 PROCEDURE — 2580000003 HC RX 258: Performed by: INTERNAL MEDICINE

## 2021-08-16 PROCEDURE — 6370000000 HC RX 637 (ALT 250 FOR IP): Performed by: NURSE PRACTITIONER

## 2021-08-16 PROCEDURE — 1210000000 HC MED SURG R&B

## 2021-08-16 PROCEDURE — 6360000002 HC RX W HCPCS: Performed by: INTERNAL MEDICINE

## 2021-08-16 PROCEDURE — 2580000003 HC RX 258: Performed by: HOSPITALIST

## 2021-08-16 PROCEDURE — 6360000002 HC RX W HCPCS: Performed by: HOSPITALIST

## 2021-08-16 PROCEDURE — 2580000003 HC RX 258: Performed by: NURSE PRACTITIONER

## 2021-08-16 RX ORDER — LISINOPRIL 20 MG/1
20 TABLET ORAL ONCE
Status: COMPLETED | OUTPATIENT
Start: 2021-08-16 | End: 2021-08-16

## 2021-08-16 RX ORDER — LISINOPRIL 20 MG/1
20 TABLET ORAL DAILY
Status: DISCONTINUED | OUTPATIENT
Start: 2021-08-16 | End: 2021-08-16

## 2021-08-16 RX ORDER — INSULIN GLARGINE 100 [IU]/ML
24 INJECTION, SOLUTION SUBCUTANEOUS NIGHTLY
Status: DISCONTINUED | OUTPATIENT
Start: 2021-08-16 | End: 2021-08-18 | Stop reason: HOSPADM

## 2021-08-16 RX ORDER — LISINOPRIL 20 MG/1
40 TABLET ORAL DAILY
Status: DISCONTINUED | OUTPATIENT
Start: 2021-08-17 | End: 2021-08-18 | Stop reason: HOSPADM

## 2021-08-16 RX ADMIN — LISINOPRIL 20 MG: 20 TABLET ORAL at 12:23

## 2021-08-16 RX ADMIN — SODIUM CHLORIDE, PRESERVATIVE FREE 1000 MG: 5 INJECTION INTRAVENOUS at 08:24

## 2021-08-16 RX ADMIN — PANTOPRAZOLE SODIUM 40 MG: 40 TABLET, DELAYED RELEASE ORAL at 04:37

## 2021-08-16 RX ADMIN — ZINC SULFATE 220 MG (50 MG) CAPSULE 50 MG: CAPSULE at 08:24

## 2021-08-16 RX ADMIN — REMDESIVIR 100 MG: 100 INJECTION, POWDER, LYOPHILIZED, FOR SOLUTION INTRAVENOUS at 04:36

## 2021-08-16 RX ADMIN — SODIUM CHLORIDE: 9 INJECTION, SOLUTION INTRAVENOUS at 21:06

## 2021-08-16 RX ADMIN — FAMOTIDINE 20 MG: 20 TABLET ORAL at 21:07

## 2021-08-16 RX ADMIN — Medication 5 MG: at 21:07

## 2021-08-16 RX ADMIN — ENOXAPARIN SODIUM 30 MG: 30 INJECTION SUBCUTANEOUS at 21:07

## 2021-08-16 RX ADMIN — ENOXAPARIN SODIUM 30 MG: 30 INJECTION SUBCUTANEOUS at 08:24

## 2021-08-16 RX ADMIN — SODIUM CHLORIDE: 9 INJECTION, SOLUTION INTRAVENOUS at 13:46

## 2021-08-16 RX ADMIN — DEXAMETHASONE 6 MG: 4 TABLET ORAL at 17:50

## 2021-08-16 RX ADMIN — SODIUM CHLORIDE, PRESERVATIVE FREE 10 ML: 5 INJECTION INTRAVENOUS at 08:24

## 2021-08-16 RX ADMIN — BUDESONIDE AND FORMOTEROL FUMARATE DIHYDRATE 2 PUFF: 160; 4.5 AEROSOL RESPIRATORY (INHALATION) at 08:34

## 2021-08-16 RX ADMIN — OXYCODONE HYDROCHLORIDE AND ACETAMINOPHEN 500 MG: 500 TABLET ORAL at 08:24

## 2021-08-16 RX ADMIN — Medication 2000 UNITS: at 08:24

## 2021-08-16 RX ADMIN — BUDESONIDE AND FORMOTEROL FUMARATE DIHYDRATE 2 PUFF: 160; 4.5 AEROSOL RESPIRATORY (INHALATION) at 21:05

## 2021-08-16 RX ADMIN — FAMOTIDINE 20 MG: 20 TABLET ORAL at 08:24

## 2021-08-16 ASSESSMENT — ENCOUNTER SYMPTOMS
CHEST TIGHTNESS: 0
NAUSEA: 0
COUGH: 1
VOMITING: 0
SHORTNESS OF BREATH: 0
ABDOMINAL PAIN: 0
COLOR CHANGE: 0
WHEEZING: 0

## 2021-08-16 NOTE — PROGRESS NOTES
Physician Progress Note      Irina Mullen  CSN #:                  [id number]  :                       1960  ADMIT DATE:       2021 7:39 PM  [address] [place] in [place] DATE:  RESPONDING  PROVIDER #:        Aquiles Long MD          QUERY TEXT:    Patient admitted with + OCOVID 19, noted to have abnormal CXR/CTA pulmonary. If possible, please document in progress notes and discharge summary if you   are evaluating and/or treating any of the following: The medical record reflects the following:  Risk Factors: COVID 19 positive, sob, abn cxr and CTA pulm  Clinical Indicators: CXR shows patchy  bilateral pneumonia, CTA Pulmonary   shows scattered groundglass opacities consistent with multifocal pneumonia,   favorable COVID 19 pneurmonia  Treatment: O2 3L, Remdesivir & IV Rocephin  Thanks, Nilda Chavarria, BSN  [phone]  Options provided:  -- COVID 19 related pneumonia  -- CXR not clinically significant  -- Other - I will add my own diagnosis  -- Disagree - Not applicable / Not valid  -- Disagree - Clinically unable to determine / Unknown  -- Refer to Clinical Documentation Reviewer    PROVIDER RESPONSE TEXT:    This patient has COVID 19 related pneumonia.     Query created by: Adriana Marshall on 2021 5:29 PM      Electronically signed by:  Aquiles Long MD 2021 5:34 PM

## 2021-08-16 NOTE — CARE COORDINATION
CM Initial Assessment   Spoke with pt to assess dc plans/needs. The following information has been reviewed and confirmed: Address:  [address] [place] [address] Street    Phone number:   [phone] (home)       Pt reports that she lives at home with her spouse. He is positive but doing well at home. She plans to return back home at OR. She was not on home o2 pta. If needed at OR, she is agreeable to use [facility] as her provider. [address] [facility] [place]  Ph: [phone]  Fax: [phone]    She does not have a pulse ox for use in the home, one will be provided for her through the [facility]. She is not sure at this time if she is interested in home health, she would like to think about it. She reports her medications as affordable and would like to use [facility] at OR. She plans for her spouse to transport her home at OR. Denies any further needs at this time.   Electronically signed by Hallie Barillas on 8/16/2021 at 9:37 AM

## 2021-08-16 NOTE — PROGRESS NOTES
[address] [facility]      Patient:  Charm Curling  YOB: 1960  Date of Service: 8/16/2021  MRN: [id number]   Acct: [de-identified]   Primary Care Physician: Osman Mesa MD  Advance Directive: Full Code  Admit Date: 8/13/2021       Hospital Day: 3  Portions of this note have been copied forward, however, changed to reflect the most current clinical status of this patient. CHIEF COMPLAINT shortness of breath, known covid    SUBJECTIVE: PT states shortness of breath has greatly improved. She reports being able to ambulate within her room. HOSPITAL COURSE:  The patient is a 57 y. o. female with past medical history of hypertension and diabetes who presented to [facility] ED complaining of shortness of breath associated with COVID-19.  Pt stated she tested positive for Covid on 8/4/2021.  Reported developing shortness of breath 3 days prior to admission.  Reported fever, chills, nonproductive cough, nausea, fatigue, weakness and decreased appetite.  She was noted to be hypoxic in ED requiring 3 L oxygen per nasal cannula, denied home oxygen use.  Work-up in ED revealed hypoxia on arrival, CRP of 7.14, ALT 55, AST 75, D-dimer 0.80, fibrinogen 583.  Chest x-ray revealed patchy bilateral pneumonia consistent with Covid pneumonia. CTA negative for PE. Requiring 2 LPM via nasal cannula. Receiving remdesivir and decadron. Increased glucose noted, increasing Lantus again. HTN noted, adjusted lisinopril to home dosage. Review of Systems:   Review of Systems   Constitutional: Negative for activity change, appetite change, chills, fatigue and fever. Respiratory: Positive for cough. Negative for chest tightness, shortness of breath and wheezing. Cardiovascular: Negative for chest pain and leg swelling. Gastrointestinal: Negative for abdominal pain, nausea and vomiting. Genitourinary: Negative for difficulty urinating and dysuria. Musculoskeletal: Negative for arthralgias and myalgias.    Skin: Negative for color change. Neurological: Negative for dizziness and light-headedness. Psychiatric/Behavioral: Negative for confusion. 14 point review of systems is negative except as specifically addressed above. Objective:   VITALS:  BP (!) 174/78   Pulse 65   Temp 97.3 °F (36.3 °C) (Temporal)   Resp 18   Ht 5' 4\" (1.626 m)   Wt 155 lb 2 oz (70.4 kg)   SpO2 94%   BMI 26.63 kg/m²   24HR INTAKE/OUTPUT:      Intake/Output Summary (Last 24 hours) at 8/16/2021 1210  Last data filed at 8/16/2021 0948  Gross per 24 hour   Intake 2474 ml   Output --   Net 2474 ml       Physical Exam  Constitutional:       Appearance: Normal appearance. Eyes:      Extraocular Movements: Extraocular movements intact. Pupils: Pupils are equal, round, and reactive to light. Cardiovascular:      Rate and Rhythm: Normal rate and regular rhythm. Pulses: Normal pulses. Heart sounds: Normal heart sounds. Pulmonary:      Effort: Pulmonary effort is normal.      Breath sounds: Normal breath sounds. No wheezing. Abdominal:      General: Abdomen is flat. Bowel sounds are normal. There is no distension. Palpations: Abdomen is soft. Tenderness: There is no abdominal tenderness. Musculoskeletal:         General: Normal range of motion. Cervical back: Normal range of motion and neck supple. Right lower leg: No edema. Left lower leg: No edema. Skin:     General: Skin is warm and dry. Capillary Refill: Capillary refill takes less than 2 seconds. Neurological:      General: No focal deficit present. Mental Status: She is alert and oriented to person, place, and time.          Medications:      sodium chloride 125 mL/hr at 08/15/21 2042    dextrose      sodium chloride        insulin glargine  24 Units Subcutaneous Nightly    insulin lispro  8 Units Subcutaneous TID WC    [START ON 8/17/2021] lisinopril  40 mg Oral Daily    lisinopril  20 mg Oral Once    cefTRIAXone (ROCEPHIN) IV 1,000 mg Intravenous Q24H    insulin lispro  0-6 Units Subcutaneous TID WC    insulin lispro  0-3 Units Subcutaneous Nightly    sodium chloride flush  5-40 mL Intravenous 2 times per day    enoxaparin  30 mg Subcutaneous BID    dexamethasone  6 mg Oral Daily    Vitamin D  2,000 Units Oral Daily    pantoprazole  40 mg Oral QAM AC    ascorbic acid  500 mg Oral Daily    budesonide-formoterol  2 puff Inhalation BID    famotidine  20 mg Oral BID    melatonin  5 mg Oral Nightly    zinc sulfate  50 mg Oral Daily    remdesivir IVPB  100 mg Intravenous Q24H     glucose, dextrose, glucagon (rDNA), dextrose, sodium chloride flush, sodium chloride, ondansetron **OR** ondansetron, acetaminophen **OR** acetaminophen, guaiFENesin-dextromethorphan, polyethylene glycol, melatonin, calcium carbonate, albuterol sulfate HFA, sodium chloride  ADULT DIET; Regular     Lab and other Data:     Recent Labs     08/14/21  0346 08/15/21  0321 08/16/21  0331   WBC 6.3 4.2* 7.6   HGB 12.7 12.6 12.3    198 223     Recent Labs     08/14/21  0346 08/15/21  0321 08/16/21  0331    140 143   K 5.0 5.4* 4.5    102 104   CO2 24 28 26   BUN 22 34* 28*   CREATININE 0.9 0.9 0.7   GLUCOSE 198* 297* 254*     Recent Labs     08/14/21  0346 08/15/21  0321 08/16/21  0331   AST 71* 53* 72*   ALT 54* 54* 75*   BILITOT 0.5 0.3 0.3   ALKPHOS 89 82 71     Troponin T:   Recent Labs     08/13/21 2036   TROPONINI <0.01     Pro-BNP: No results for input(s): BNP in the last 72 hours.   INR:   Recent Labs     08/13/21 2036   INR 0.92     UA:  Recent Labs     08/14/21  1525   COLORU YELLOW   PHUR 5.0   WBCUA 112*   RBCUA 4   BACTERIA 1+*   CLARITYU Clear   SPECGRAV >=1.045   LEUKOCYTESUR SMALL*   UROBILINOGEN 1.0   BILIRUBINUR Negative   BLOODU MODERATE*   GLUCOSEU =>1000     A1C:   Recent Labs     08/13/21 2036   LABA1C 6.7*     ABG:No results for input(s): PHART, KZM2LNG, PO2ART, TJU3GJD, BEART, HGBAE, Z1RFCJAL, CARBOXHGBART in the last 72 hours. RAD:   XR CHEST PORTABLE    Result Date: 8/13/2021  . Patchy bilateral pneumonia. Signed by Dr John Butler    Result Date: 8/14/2021  1. No pulmonary emboli. 2. Scattered bilateral predominantly peripheral groundglass opacities consistent with multifocal pneumonia. The appearance is favorable COVID-19 pneumonia. 3. Mild cardiomegaly with scattered vascular calcification.  Signed by Dr Michael Al           Assessment/Plan   Principal Problem:    Acute hypoxemic respiratory failure due to severe acute respiratory syndrome coronavirus 2 (SARS-CoV-2) disease ([facility]  - Covid adjuvant therapy with zinc, vitamin C, vitamin D, Pepcid, melatonin              - Decadron 6 mg p.o. daily for 10 days               - Robitussin as needed              - Bronchodilators              - Incentive spirometry              - Encourage deep breathing and cough              - Supplemental oxygen as needed              - Droplet plus isolation              - remdesivir therapy day 3/5              -continue supportive care                     -home oxygen evaluation before discharge   -Lovenox      Active Problems:    HTN (hypertension)              -monitor   -adjusted lisinopril dosage       DM (diabetes mellitus) ([facility]              -accus   -increased basal insulin              -SSI              -hypoglycemia protocol              -hold oral agents at this time       Former heavy cigarette smoker (20-39 per day)              -noted        DVT Prophylaxis: Lovenox       PHILIP Mcdonald - CNP, 8/16/2021 12:10 PM

## 2021-08-16 NOTE — PROGRESS NOTES
Palliative Care:   Palliative Care initiated for support, goals, and advanced care planning. Patient is alert and awake, able to answer questions and participate in conversation. Reviewed life at home prior to hospitalization, patient states that she lives at home with her . Patient states that she plans to return home once she is discharged from hospital.    Past Medical History:        Past Medical History:   Diagnosis Date    DM (diabetes mellitus) ([facility]     HTN (hypertension)        Advance Directives:   No Advanced Directive on file. Patient states that she does not wish to fill out a living will or advanced directive at this time. See ACP Note. Pain/Other Symptoms:    Patient denies any pain. Patient denies shortness of breath, patient states that she is on supplemental oxygen. Patient denies nausea or vomiting. Patient reports fair appetite. Patient denies any issue with taste or smell being affected by covid 19. Patient denies diarrhea. Activity:         As Tolerated. Psychological/Spiritual:    Patient reports that she is a [facility], and is [facility].    Plan:    Continue medical treatment and monitoring. Plans to return home when discharged from hospital.    Patient/family discussion r/t goals: Patient states that her goal is to return home with her  once she is discharged from hospital.    Review of any needed services:  Patient states that she currently has no medical equipment at home and states that she does not need any medical equipment that she can think of prior to discharging home. Active Listening Provided. Palliative Care will continue to follow and support this patient throughout her hospitalization.     Electronically signed by Lucio Faith RN on 8/16/2021 at 1:49 PM

## 2021-08-16 NOTE — ACP (ADVANCE CARE PLANNING)
Advance Care Planning     Advance Care Planning Activator (Inpatient)  Conversation Note      Date of ACP Conversation: 8/16/2021     Conversation Conducted with:   Patient, Teresa Estevez, who is alert and oriented, able to answer questions and participate in conversation. ACP Activator: Vikram Aviles RN    Health Care Decision Maker:     Current Designated Health Care Decision Maker:     Primary Decision Maker: Ronald Ross - [facility] - [phone]    Care Preferences    Ventilation: \"If you were in your present state of health and suddenly became very ill and were unable to breathe on your own, what would your preference be about the use of a ventilator (breathing machine) if it were available to you? \"      Would the patient desire the use of ventilator (breathing machine)?:   YES    \"If your health worsens and it becomes clear that your chance of recovery is unlikely, what would your preference be about the use of a ventilator (breathing machine) if it were available to you? \"     Would the patient desire the use of ventilator (breathing machine)?:   YES      Resuscitation  \"CPR works best to restart the heart when there is a sudden event, like a heart attack, in someone who is otherwise healthy. Unfortunately, CPR does not typically restart the heart for people who have serious health conditions or who are very sick. \"    \"In the event your heart stopped as a result of an underlying serious health condition, would you want attempts to be made to restart your heart (answer \"yes\" for attempt to resuscitate) or would you prefer a natural death (answer \"no\" for do not attempt to resuscitate)? \" YES       Conversation Outcomes:  [x] ACP discussion completed  [] Existing advance directive reviewed with patient; no changes to patient's previously recorded wishes  [] New Advance Directive completed  [] Portable Do Not Rescitate prepared for Provider review and signature  [] POLST/POST/MOLST/MOST prepared for Provider review and signature. Follow-up plan: Follow and support this patient throughout their hospitalization.     Electronically signed by Mimi Willson RN on 8/16/2021 at 1:49 PM

## 2021-08-17 LAB
ALBUMIN SERPL-MCNC: 3.2 G/DL (ref 3.5–5.2)
ALP BLD-CCNC: 61 U/L (ref 35–104)
ALT SERPL-CCNC: 72 U/L (ref 5–33)
ANION GAP SERPL CALCULATED.3IONS-SCNC: 11 MMOL/L (ref 7–19)
AST SERPL-CCNC: 58 U/L (ref 5–32)
BASOPHILS ABSOLUTE: 0 K/UL (ref 0–0.2)
BASOPHILS RELATIVE PERCENT: 0 % (ref 0–1)
BILIRUB SERPL-MCNC: 0.4 MG/DL (ref 0.2–1.2)
BUN BLDV-MCNC: 21 MG/DL (ref 8–23)
CALCIUM SERPL-MCNC: 8.1 MG/DL (ref 8.8–10.2)
CHLORIDE BLD-SCNC: 108 MMOL/L (ref 98–111)
CO2: 25 MMOL/L (ref 22–29)
CREAT SERPL-MCNC: 0.6 MG/DL (ref 0.5–0.9)
EOSINOPHILS ABSOLUTE: 0 K/UL (ref 0–0.6)
EOSINOPHILS RELATIVE PERCENT: 0 % (ref 0–5)
GFR AFRICAN AMERICAN: >59
GFR NON-AFRICAN AMERICAN: >60
GLUCOSE BLD-MCNC: 154 MG/DL (ref 70–99)
GLUCOSE BLD-MCNC: 162 MG/DL (ref 70–99)
GLUCOSE BLD-MCNC: 196 MG/DL (ref 70–99)
GLUCOSE BLD-MCNC: 202 MG/DL (ref 74–109)
GLUCOSE BLD-MCNC: 74 MG/DL (ref 70–99)
HCT VFR BLD CALC: 36.9 % (ref 37–47)
HEMOGLOBIN: 11.7 G/DL (ref 12–16)
IMMATURE GRANULOCYTES #: 0 K/UL
LYMPHOCYTES ABSOLUTE: 1.1 K/UL (ref 1.1–4.5)
LYMPHOCYTES RELATIVE PERCENT: 14.3 % (ref 20–40)
MCH RBC QN AUTO: 29 PG (ref 27–31)
MCHC RBC AUTO-ENTMCNC: 31.7 G/DL (ref 33–37)
MCV RBC AUTO: 91.6 FL (ref 81–99)
MONOCYTES ABSOLUTE: 0.4 K/UL (ref 0–0.9)
MONOCYTES RELATIVE PERCENT: 5.7 % (ref 0–10)
NEUTROPHILS ABSOLUTE: 5.9 K/UL (ref 1.5–7.5)
NEUTROPHILS RELATIVE PERCENT: 79.6 % (ref 50–65)
PDW BLD-RTO: 12.9 % (ref 11.5–14.5)
PERFORMED ON: ABNORMAL
PERFORMED ON: NORMAL
PLATELET # BLD: 230 K/UL (ref 130–400)
PMV BLD AUTO: 11.4 FL (ref 9.4–12.3)
POTASSIUM REFLEX MAGNESIUM: 4.2 MMOL/L (ref 3.5–5)
RBC # BLD: 4.03 M/UL (ref 4.2–5.4)
REASON FOR REJECTION: NORMAL
REJECTED TEST: NORMAL
SODIUM BLD-SCNC: 144 MMOL/L (ref 136–145)
TOTAL PROTEIN: 5.8 G/DL (ref 6.6–8.7)
WBC # BLD: 7.4 K/UL (ref 4.8–10.8)

## 2021-08-17 PROCEDURE — 6370000000 HC RX 637 (ALT 250 FOR IP): Performed by: HOSPITALIST

## 2021-08-17 PROCEDURE — 2500000003 HC RX 250 WO HCPCS: Performed by: NURSE PRACTITIONER

## 2021-08-17 PROCEDURE — 80053 COMPREHEN METABOLIC PANEL: CPT

## 2021-08-17 PROCEDURE — 1210000000 HC MED SURG R&B

## 2021-08-17 PROCEDURE — 2580000003 HC RX 258: Performed by: NURSE PRACTITIONER

## 2021-08-17 PROCEDURE — 2700000000 HC OXYGEN THERAPY PER DAY

## 2021-08-17 PROCEDURE — 6370000000 HC RX 637 (ALT 250 FOR IP): Performed by: NURSE PRACTITIONER

## 2021-08-17 PROCEDURE — 82947 ASSAY GLUCOSE BLOOD QUANT: CPT

## 2021-08-17 PROCEDURE — 36415 COLL VENOUS BLD VENIPUNCTURE: CPT

## 2021-08-17 PROCEDURE — 85025 COMPLETE CBC W/AUTO DIFF WBC: CPT

## 2021-08-17 PROCEDURE — 2580000003 HC RX 258: Performed by: INTERNAL MEDICINE

## 2021-08-17 PROCEDURE — 6370000000 HC RX 637 (ALT 250 FOR IP): Performed by: INTERNAL MEDICINE

## 2021-08-17 PROCEDURE — 6360000002 HC RX W HCPCS: Performed by: HOSPITALIST

## 2021-08-17 PROCEDURE — 6360000002 HC RX W HCPCS: Performed by: INTERNAL MEDICINE

## 2021-08-17 RX ORDER — AMLODIPINE BESYLATE 10 MG/1
10 TABLET ORAL DAILY
Status: DISCONTINUED | OUTPATIENT
Start: 2021-08-17 | End: 2021-08-18 | Stop reason: HOSPADM

## 2021-08-17 RX ADMIN — SODIUM CHLORIDE: 9 INJECTION, SOLUTION INTRAVENOUS at 21:16

## 2021-08-17 RX ADMIN — Medication 5 MG: at 21:11

## 2021-08-17 RX ADMIN — SODIUM CHLORIDE, PRESERVATIVE FREE 1000 MG: 5 INJECTION INTRAVENOUS at 08:22

## 2021-08-17 RX ADMIN — AMLODIPINE BESYLATE 10 MG: 10 TABLET ORAL at 08:25

## 2021-08-17 RX ADMIN — ENOXAPARIN SODIUM 30 MG: 30 INJECTION SUBCUTANEOUS at 08:22

## 2021-08-17 RX ADMIN — ZINC SULFATE 220 MG (50 MG) CAPSULE 50 MG: CAPSULE at 08:23

## 2021-08-17 RX ADMIN — SODIUM CHLORIDE: 9 INJECTION, SOLUTION INTRAVENOUS at 14:47

## 2021-08-17 RX ADMIN — PANTOPRAZOLE SODIUM 40 MG: 40 TABLET, DELAYED RELEASE ORAL at 05:55

## 2021-08-17 RX ADMIN — SODIUM CHLORIDE: 9 INJECTION, SOLUTION INTRAVENOUS at 05:55

## 2021-08-17 RX ADMIN — BUDESONIDE AND FORMOTEROL FUMARATE DIHYDRATE 2 PUFF: 160; 4.5 AEROSOL RESPIRATORY (INHALATION) at 21:10

## 2021-08-17 RX ADMIN — FAMOTIDINE 20 MG: 20 TABLET ORAL at 21:11

## 2021-08-17 RX ADMIN — OXYCODONE HYDROCHLORIDE AND ACETAMINOPHEN 500 MG: 500 TABLET ORAL at 08:23

## 2021-08-17 RX ADMIN — ENOXAPARIN SODIUM 30 MG: 30 INJECTION SUBCUTANEOUS at 21:10

## 2021-08-17 RX ADMIN — DEXAMETHASONE 6 MG: 4 TABLET ORAL at 17:42

## 2021-08-17 RX ADMIN — REMDESIVIR 100 MG: 100 INJECTION, POWDER, LYOPHILIZED, FOR SOLUTION INTRAVENOUS at 04:37

## 2021-08-17 RX ADMIN — Medication 2000 UNITS: at 08:23

## 2021-08-17 RX ADMIN — LISINOPRIL 40 MG: 20 TABLET ORAL at 08:23

## 2021-08-17 RX ADMIN — BUDESONIDE AND FORMOTEROL FUMARATE DIHYDRATE 2 PUFF: 160; 4.5 AEROSOL RESPIRATORY (INHALATION) at 08:26

## 2021-08-17 RX ADMIN — FAMOTIDINE 20 MG: 20 TABLET ORAL at 08:23

## 2021-08-17 NOTE — PROGRESS NOTES
[facility] Hospital[facility]        Hospitalist Progress Note  8/17/2021 9:45 AM  Subjective:   Admit Date: 8/13/2021  PCP: Fani Thompson MD    Chief Complaint: Dyspnea    Subjective: Patient seen and examined at bedside. Waiting on her phone. Appears comfortable. No acute complaints. Cumulative Hospital History: 64-year-old female with a past medical history of hypertension, diabetes, former smoker presented to the hospital found to have elevated D-dimer with CTA PE study performed showing no pulmonary emboli but findings of COVID-19. Patient admitted for COVID-19 infection. Tocilizumab not given as she did not meet CRP requirement. ROS: 14 point review of systems is negative except as specifically addressed above. ADULT DIET;  Regular    Intake/Output Summary (Last 24 hours) at 8/17/2021 0945  Last data filed at 8/16/2021 1818  Gross per 24 hour   Intake 320 ml   Output --   Net 320 ml     Medications:   sodium chloride 125 mL/hr at 08/17/21 0555    dextrose      sodium chloride       Current Facility-Administered Medications   Medication Dose Route Frequency Provider Last Rate Last Admin    amLODIPine (NORVASC) tablet 10 mg  10 mg Oral Daily Addy Cervantes MD   10 mg at 08/17/21 0825    insulin glargine (LANTUS) injection vial 24 Units  24 Units Subcutaneous Nightly Addy Cervantes MD        insulin lispro (HUMALOG) injection vial 8 Units  8 Units Subcutaneous TID  Addy Cervantes MD   8 Units at 08/16/21 1750    lisinopril (PRINIVIL;ZESTRIL) tablet 40 mg  40 mg Oral Daily Addy Cervanets MD   40 mg at 08/17/21 0823    0.9 % sodium chloride infusion   Intravenous Continuous Addy Cervantes  mL/hr at 08/17/21 0555 New Bag at 08/17/21 0555    insulin lispro (HUMALOG) injection vial 0-6 Units  0-6 Units Subcutaneous TID  Addy Cervantes MD   4 Units at 08/16/21 1215    insulin lispro (HUMALOG) injection vial 0-3 Units  0-3 Units Subcutaneous Nightly Addy Cervantes MD        glucose (GLUTOSE) 40 % oral gel 15 g  15 g Oral PRN Raissa Gan MD        dextrose 50 % IV solution  12.5 g Intravenous PRN Raissa Gan MD        glucagon (rDNA) injection 1 mg  1 mg Intramuscular PRN Raissa Gan MD        dextrose 5 % solution  100 mL/hr Intravenous PRN Raissa Gan MD        sodium chloride flush 0.9 % injection 5-40 mL  5-40 mL Intravenous 2 times per day Elizabeth Hines MD   10 mL at 08/16/21 0824    sodium chloride flush 0.9 % injection 5-40 mL  5-40 mL Intravenous PRN Elizabeth Hines MD        0.9 % sodium chloride infusion  25 mL Intravenous PRN Elizabeth Hines MD        enoxaparin (LOVENOX) injection 30 mg  30 mg Subcutaneous BID Elizabeth Hines MD   30 mg at 08/17/21 8066    ondansetron (ZOFRAN-ODT) disintegrating tablet 4 mg  4 mg Oral Q8H PRN Elizabeth Hines MD        Or    ondansetron [facility]) injection 4 mg  4 mg Intravenous Q6H PRN Elizabeth Hines MD        acetaminophen (TYLENOL) tablet 650 mg  650 mg Oral Q6H PRN Elizabeth Hines MD        Or    acetaminophen (TYLENOL) suppository 650 mg  650 mg Rectal Q6H PRN Elizabeth Hines MD        guaiFENesin-dextromethorphan (ROBITUSSIN DM) 100-10 MG/5ML syrup 5 mL  5 mL Oral Q4H PRN Elizabeth Hines MD   5 mL at 08/14/21 0835    dexamethasone (DECADRON) tablet 6 mg  6 mg Oral Daily Elizabeth Hines MD   6 mg at 08/16/21 1750    Vitamin D (CHOLECALCIFEROL) tablet 2,000 Units  2,000 Units Oral Daily Elizabeth Hines MD   2,000 Units at 08/17/21 0823    polyethylene glycol (GLYCOLAX) packet 17 g  17 g Oral Daily PRN Elizabeth Hines MD        melatonin disintegrating tablet 5 mg  5 mg Oral Nightly PRN Elizabeth Hines MD        calcium carbonate (TUMS) chewable tablet 500 mg  500 mg Oral TID PRN Elizabeth Hines MD        pantoprazole (PROTONIX) tablet 40 mg  40 mg Oral QAM AC Elizabeth Hines MD   40 mg at 08/17/21 0555    albuterol sulfate  (90 Base) MCG/ACT inhaler 2 puff  2 puff Inhalation Q6H PRN PHILIP Vivar - CNP        ascorbic acid (VITAMIN C) tablet 500 mg  500 mg Oral Daily Diamond Flax, APRN - CNP   500 mg at 08/17/21 8760    budesonide-formoterol (SYMBICORT) 160-4.5 MCG/ACT inhaler 2 puff  2 puff Inhalation BID Diamond Flax, APRN - CNP   2 puff at 08/17/21 0826    famotidine (PEPCID) tablet 20 mg  20 mg Oral BID Diamond Flax, APRN - CNP   20 mg at 08/17/21 1494    melatonin disintegrating tablet 5 mg  5 mg Oral Nightly Diamond Flax, APRN - CNP   5 mg at 08/16/21 2107    zinc sulfate (ZINCATE) capsule 50 mg  50 mg Oral Daily Diamond Flax, APRN - CNP   50 mg at 08/17/21 3687    remdesivir 100 mg in sodium chloride 0.9 % 250 mL IVPB  100 mg Intravenous Q24H Diamond Flax, APRN - CNP   Stopped at 08/17/21 1221    0.9 % sodium chloride bolus  30 mL Intravenous PRN Diamond Flax, APRN - CNP            Labs:     Recent Labs     08/15/21  0321 08/16/21  0331 08/17/21  0609   WBC 4.2* 7.6 7.4   RBC 4.37 4.24 4.03*   HGB 12.6 12.3 11.7*   HCT 39.7 38.9 36.9*   MCV 90.8 91.7 91.6   MCH 28.8 29.0 29.0   MCHC 31.7* 31.6* 31.7*    223 230     Recent Labs     08/15/21  0321 08/16/21  0331 08/17/21  0335    143 144   K 5.4* 4.5 4.2   ANIONGAP 10 13 11    104 108   CO2 28 26 25   BUN 34* 28* 21   CREATININE 0.9 0.7 0.6   GLUCOSE 297* 254* 202*   CALCIUM 8.9 8.4* 8.1*     No results for input(s): MG, PHOS in the last 72 hours. Recent Labs     08/15/21  0321 08/16/21  0331 08/17/21  0335   AST 53* 72* 58*   ALT 54* 75* 72*   BILITOT 0.3 0.3 0.4   ALKPHOS 82 71 61     ABGs:No results for input(s): PH, PO2, PCO2, HCO3, BE, O2SAT in the last 72 hours. Troponin T: No results for input(s): TROPONINI in the last 72 hours. INR: No results for input(s): INR in the last 72 hours. Lactic Acid: No results for input(s): LACTA in the last 72 hours.     Objective:   Vitals: BP (!) 199/79 Comment: RN notified  Pulse 58   Temp 96.7 °F (35.9 °C) (Temporal)   Resp 18   Ht 5' 4\" (1.626 m)   Wt 155 lb 2 oz (70.4 kg)

## 2021-08-18 VITALS
TEMPERATURE: 98.1 F | WEIGHT: 160.5 LBS | DIASTOLIC BLOOD PRESSURE: 81 MMHG | HEART RATE: 56 BPM | BODY MASS INDEX: 27.4 KG/M2 | HEIGHT: 64 IN | RESPIRATION RATE: 18 BRPM | SYSTOLIC BLOOD PRESSURE: 169 MMHG | OXYGEN SATURATION: 94 %

## 2021-08-18 LAB
ALBUMIN SERPL-MCNC: 3.2 G/DL (ref 3.5–5.2)
ALP BLD-CCNC: 59 U/L (ref 35–104)
ALT SERPL-CCNC: 58 U/L (ref 5–33)
ANION GAP SERPL CALCULATED.3IONS-SCNC: 9 MMOL/L (ref 7–19)
AST SERPL-CCNC: 30 U/L (ref 5–32)
BASOPHILS ABSOLUTE: 0 K/UL (ref 0–0.2)
BASOPHILS RELATIVE PERCENT: 0 % (ref 0–1)
BILIRUB SERPL-MCNC: 0.6 MG/DL (ref 0.2–1.2)
BUN BLDV-MCNC: 14 MG/DL (ref 8–23)
CALCIUM SERPL-MCNC: 8.1 MG/DL (ref 8.8–10.2)
CHLORIDE BLD-SCNC: 105 MMOL/L (ref 98–111)
CO2: 28 MMOL/L (ref 22–29)
CREAT SERPL-MCNC: 0.5 MG/DL (ref 0.5–0.9)
EOSINOPHILS ABSOLUTE: 0 K/UL (ref 0–0.6)
EOSINOPHILS RELATIVE PERCENT: 0 % (ref 0–5)
GFR AFRICAN AMERICAN: >59
GFR NON-AFRICAN AMERICAN: >60
GLUCOSE BLD-MCNC: 212 MG/DL (ref 70–99)
GLUCOSE BLD-MCNC: 215 MG/DL (ref 70–99)
GLUCOSE BLD-MCNC: 239 MG/DL (ref 74–109)
HCT VFR BLD CALC: 37.6 % (ref 37–47)
HEMOGLOBIN: 12.1 G/DL (ref 12–16)
IMMATURE GRANULOCYTES #: 0 K/UL
LYMPHOCYTES ABSOLUTE: 0.8 K/UL (ref 1.1–4.5)
LYMPHOCYTES RELATIVE PERCENT: 12 % (ref 20–40)
MCH RBC QN AUTO: 29.2 PG (ref 27–31)
MCHC RBC AUTO-ENTMCNC: 32.2 G/DL (ref 33–37)
MCV RBC AUTO: 90.6 FL (ref 81–99)
MONOCYTES ABSOLUTE: 0.3 K/UL (ref 0–0.9)
MONOCYTES RELATIVE PERCENT: 4.6 % (ref 0–10)
NEUTROPHILS ABSOLUTE: 5.2 K/UL (ref 1.5–7.5)
NEUTROPHILS RELATIVE PERCENT: 82.9 % (ref 50–65)
PDW BLD-RTO: 12.7 % (ref 11.5–14.5)
PERFORMED ON: ABNORMAL
PERFORMED ON: ABNORMAL
PLATELET # BLD: 231 K/UL (ref 130–400)
PMV BLD AUTO: 11.8 FL (ref 9.4–12.3)
POTASSIUM REFLEX MAGNESIUM: 3.9 MMOL/L (ref 3.5–5)
RBC # BLD: 4.15 M/UL (ref 4.2–5.4)
SODIUM BLD-SCNC: 142 MMOL/L (ref 136–145)
TOTAL PROTEIN: 5.4 G/DL (ref 6.6–8.7)
WBC # BLD: 6.3 K/UL (ref 4.8–10.8)

## 2021-08-18 PROCEDURE — 6370000000 HC RX 637 (ALT 250 FOR IP): Performed by: HOSPITALIST

## 2021-08-18 PROCEDURE — 94761 N-INVAS EAR/PLS OXIMETRY MLT: CPT

## 2021-08-18 PROCEDURE — 2500000003 HC RX 250 WO HCPCS: Performed by: NURSE PRACTITIONER

## 2021-08-18 PROCEDURE — 82947 ASSAY GLUCOSE BLOOD QUANT: CPT

## 2021-08-18 PROCEDURE — 2580000003 HC RX 258: Performed by: NURSE PRACTITIONER

## 2021-08-18 PROCEDURE — 36415 COLL VENOUS BLD VENIPUNCTURE: CPT

## 2021-08-18 PROCEDURE — 85025 COMPLETE CBC W/AUTO DIFF WBC: CPT

## 2021-08-18 PROCEDURE — 6360000002 HC RX W HCPCS: Performed by: HOSPITALIST

## 2021-08-18 PROCEDURE — 6370000000 HC RX 637 (ALT 250 FOR IP): Performed by: NURSE PRACTITIONER

## 2021-08-18 PROCEDURE — 6370000000 HC RX 637 (ALT 250 FOR IP): Performed by: INTERNAL MEDICINE

## 2021-08-18 PROCEDURE — 2700000000 HC OXYGEN THERAPY PER DAY

## 2021-08-18 PROCEDURE — 80053 COMPREHEN METABOLIC PANEL: CPT

## 2021-08-18 RX ORDER — ZINC SULFATE 50(220)MG
50 CAPSULE ORAL DAILY
Qty: 30 CAPSULE | Refills: 0 | COMMUNITY
Start: 2021-08-19 | End: 2021-09-18

## 2021-08-18 RX ORDER — ASCORBIC ACID 500 MG
500 TABLET ORAL DAILY
Qty: 30 TABLET | Refills: 0 | Status: SHIPPED | OUTPATIENT
Start: 2021-08-19

## 2021-08-18 RX ORDER — CHOLECALCIFEROL (VITAMIN D3) 50 MCG
2000 TABLET ORAL DAILY
Qty: 60 TABLET | Refills: 0 | Status: SHIPPED | OUTPATIENT
Start: 2021-08-19

## 2021-08-18 RX ORDER — BUDESONIDE AND FORMOTEROL FUMARATE DIHYDRATE 160; 4.5 UG/1; UG/1
2 AEROSOL RESPIRATORY (INHALATION) 2 TIMES DAILY
Qty: 1 INHALER | Refills: 0 | Status: SHIPPED | OUTPATIENT
Start: 2021-08-18

## 2021-08-18 RX ORDER — AMLODIPINE BESYLATE 10 MG/1
10 TABLET ORAL DAILY
Qty: 30 TABLET | Refills: 3 | Status: SHIPPED | OUTPATIENT
Start: 2021-08-19

## 2021-08-18 RX ORDER — ALBUTEROL SULFATE 90 UG/1
2 AEROSOL, METERED RESPIRATORY (INHALATION) 4 TIMES DAILY PRN
Qty: 1 INHALER | Refills: 0 | Status: SHIPPED | OUTPATIENT
Start: 2021-08-18

## 2021-08-18 RX ADMIN — OXYCODONE HYDROCHLORIDE AND ACETAMINOPHEN 500 MG: 500 TABLET ORAL at 07:52

## 2021-08-18 RX ADMIN — FAMOTIDINE 20 MG: 20 TABLET ORAL at 07:53

## 2021-08-18 RX ADMIN — AMLODIPINE BESYLATE 10 MG: 10 TABLET ORAL at 07:52

## 2021-08-18 RX ADMIN — PANTOPRAZOLE SODIUM 40 MG: 40 TABLET, DELAYED RELEASE ORAL at 04:56

## 2021-08-18 RX ADMIN — BUDESONIDE AND FORMOTEROL FUMARATE DIHYDRATE 2 PUFF: 160; 4.5 AEROSOL RESPIRATORY (INHALATION) at 07:54

## 2021-08-18 RX ADMIN — ZINC SULFATE 220 MG (50 MG) CAPSULE 50 MG: CAPSULE at 07:52

## 2021-08-18 RX ADMIN — ENOXAPARIN SODIUM 30 MG: 30 INJECTION SUBCUTANEOUS at 07:53

## 2021-08-18 RX ADMIN — REMDESIVIR 100 MG: 100 INJECTION, POWDER, LYOPHILIZED, FOR SOLUTION INTRAVENOUS at 04:53

## 2021-08-18 RX ADMIN — Medication 2000 UNITS: at 07:52

## 2021-08-18 RX ADMIN — LISINOPRIL 40 MG: 20 TABLET ORAL at 07:52

## 2021-08-18 NOTE — CARE COORDINATION
Eliquis coupon has been provided for pt in chart if needed at [place].    Electronically signed by Gisela Emmanuel on 8/18/2021 at 10:50 AM

## 2021-08-18 NOTE — PROGRESS NOTES
08:45a - Home oxygen evaluation performed and results are as follows: SaO2 = 94% on R/A at rest, SaO2 = 95% on 2 lpm O2(NC) at rest, SaO2 = 95% on R/A while ambulating, SaO2 = 94% on 2 lpm O2 (NC) while ambulating. (Recovery SaO2).  TS RRT/RCP

## 2021-08-18 NOTE — DISCHARGE SUMMARY
Discharge Summary      Mikael Huff  :  1960  MRN:  [id number]    Admit date:  2021  Discharge date:      Admitting Physician:  No admitting provider for patient encounter. Advance Directive: Full Code    Consults: none    Primary Care Physician:  Bree Sands MD    Discharge Diagnoses:  Principal Problem:    Acute hypoxemic respiratory failure due to severe acute respiratory syndrome coronavirus 2 (SARS-CoV-2) disease ([facility]  Active Problems:    HTN (hypertension)    DM (diabetes mellitus) ([facility]    Former heavy cigarette smoker (20-39 per day)  Resolved Problems:    * No resolved hospital problems. *      Significant Diagnostic Studies:   XR CHEST PORTABLE    Result Date: 2021  EXAMINATION: Chest one view 2021 HISTORY: Dyspnea FINDINGS: Upright frontal projection of the chest demonstrates right upper lobe and bilateral lower lobe interstitial infiltrates consistent with Covid pneumonia. There is no effusion or free air present. . Patchy bilateral pneumonia. Signed by Dr Estuardo Mccarty    Result Date: 2021  CTA chest 2021 8:24 AM HISTORY: Hypoxia, COVID, elevated d-dimer TECHNIQUE: Axial images of the chest were obtained following IV contrast. . Coronal and sagittal maximum intensity projectional reformatted images are reconstructed and reviewed. COMPARISON: None. DLP: 227 mGy cm Automated exposure control was utilized to minimize patient radiation dose. FINDINGS: No pulmonary emboli identified. All thoracic aortic calcification with no aneurysm or dissection. Coronary calcification. Mild cardiomegaly. No pericardial or pleural effusions. No pathologic intrathoracic thoracic or axillary lymphadenopathy. Images the upper abdomen does not include visualization adrenal glands. No upper abdominal pathology identified. There are scattered peripheral bilateral groundglass opacities with a basilar predilection. Findings are often seen with COVID-19 pneumonia. No pneumothorax. No discrete endobronchial lesions. No focal destructive osseous lesions. 1. No pulmonary emboli. 2. Scattered bilateral predominantly peripheral groundglass opacities consistent with multifocal pneumonia. The appearance is favorable COVID-19 pneumonia. 3. Mild cardiomegaly with scattered vascular calcification. Signed by Dr Cliff Rodriguez      Pertinent Labs:   CBC:   Recent Labs     08/16/21  0331 08/17/21  0609 08/18/21  0405   WBC 7.6 7.4 6.3   HGB 12.3 11.7* 12.1    230 231     BMP:    Recent Labs     08/16/21  0331 08/17/21  0335 08/18/21  0405    144 142   K 4.5 4.2 3.9    108 105   CO2 26 25 28   BUN 28* 21 14   CREATININE 0.7 0.6 0.5   GLUCOSE 254* 202* 239*     INR: No results for input(s): INR in the last 72 hours. ABGs:No results for input(s): PH, PO2, PCO2, HCO3, BE, O2SAT in the last 72 hours. Lactic Acid:No results for input(s): LACTA in the last 72 hours. Procedures: None  Hospital Course: 56-year-old female with a past medical history of hypertension, diabetes, former smoker presented to the hospital found to have elevated D-dimer with CTA PE study performed showing no pulmonary emboli but findings of COVID-19. Patient admitted for COVID-19 infection. Tocilizumab not given as she did not meet CRP requirement. She has completed 5 days of remdesivir therapy. Patient has received steroids while inpatient. Home oxygen screening performed prior to discharge and she does not require home oxygen. Patient will require outpatient follow-up imaging of her lungs after resolution of COVID-19. Patient feels well and is currently hemodynamically stable for discharge home to follow-up with her PCP as an outpatient. Home health has been ordered for her. She will be discharged with Eliquis 2.5 mg p.o. twice daily for 1 month as prophylaxis.     Physical Exam:  Vitals: BP (!) 169/81   Pulse 56   Temp 98.1 °F (36.7 °C) (Oral)   Resp 18   Ht 5' 4\" (1.626 m)   Wt 160 lb 8 oz (72.8 kg)   SpO2 94%   BMI 27.55 kg/m²   24HR INTAKE/OUTPUT:      Intake/Output Summary (Last 24 hours) at 8/18/2021 1005  Last data filed at 8/17/2021 1845  Gross per 24 hour   Intake 360 ml   Output --   Net 360 ml     General appearance: Alert  HEENT: AT/NC  Lungs: no increased work of breathing, BLAE  Heart: RRR  Abdomen: BS+, soft, NT, ND  Extremities: Pulses+  Neurologic: Alert, gross motor function intact  Skin: warm     Discharge Medications:       [facility] Medication Instructions [facility]:[id number]    Printed on:08/18/21 1005   Medication Information                      albuterol sulfate HFA (VENTOLIN HFA) 108 (90 Base) MCG/ACT inhaler  Inhale 2 puffs into the lungs 4 times daily as needed for Wheezing             amLODIPine (NORVASC) 10 MG tablet  Take 1 tablet by mouth daily             apixaban (ELIQUIS) 2.5 MG TABS tablet  Take 1 tablet by mouth 2 times daily             ascorbic acid (VITAMIN C) 500 MG tablet  Take 1 tablet by mouth daily             budesonide-formoterol (SYMBICORT) 160-4.5 MCG/ACT AERO  Inhale 2 puffs into the lungs 2 times daily             dapagliflozin (FARXIGA) 10 MG tablet  Take 10 mg by mouth every morning             glipiZIDE (GLUCOTROL) 5 MG tablet  Take 5 mg by mouth daily             lisinopril (PRINIVIL;ZESTRIL) 40 MG tablet  Take 40 mg by mouth daily             metFORMIN (GLUCOPHAGE) 1000 MG tablet  Take 1,000 mg by mouth 2 times daily (with meals)             pravastatin (PRAVACHOL) 40 MG tablet  Take 40 mg by mouth daily             Vitamin D (CHOLECALCIFEROL) 50 MCG (2000 UT) TABS tablet  Take 1 tablet by mouth daily             zinc sulfate (ZINCATE) 220 (50 Zn) MG capsule  Take 1 capsule by mouth daily                 Discharge Instructions: Follow up with Lea Carr MD in 7 days. Take medications as directed. Resume activity as tolerated. Diet: ADULT DIET;  Regular     Disposition: Patient is medically stable and will be discharged Home. Time spent on discharge 35 minutes.     Signed:  Mague Paiz MD 8/18/2021 10:05 AM

## 2021-08-18 NOTE — CARE COORDINATION
HH referral received. Call placed to patient to discuss. States that she does not feel as if she needs HH and does not want to pursue.   No further HH interventions noted Electronically signed by Aron Melo on 8/18/21 at 11:26 AM CDT

## 2021-08-19 ENCOUNTER — CARE COORDINATION (OUTPATIENT)
Dept: CASE MANAGEMENT | Age: 61
End: 2021-08-19

## 2021-08-19 NOTE — ADT AUTH CERT
Viral Illness, Acute - Care Day 5 (8/17/2021) by Ange Santiago RN       Review Status Review Entered   Completed 8/18/2021 09:36      Criteria Review      Care Day: 5 Care Date: 8/17/2021 Level of Care: Telemetry    Guideline Day 3    Clinical Status    ( ) * Hemodynamic stability    ( ) * Afebrile or temperature acceptable for next level of care    ( ) * Tachypnea absent    ( ) * Hypoxemia absent    ( ) * Mental status at baseline    ( ) * Renal function at baseline, or stable and acceptable for next level of care    ( ) * Discharge plans and education understood    Activity    ( ) * Ambulatory or acceptable for next level of care    Routes    ( ) * Oral hydration    ( ) * Oral medications or regimen acceptable for next level of care    ( ) * Oral diet or acceptable for next level of care    Interventions    ( ) * Isolation not indicated, or is performable at next level of care    Medications    ( ) * Antimicrobial medication absent or regimen established for next level of care    * Milestone   Additional Notes   CARE DAY 5     8/17/2021      REMAINS IN COVID UNIT O2 REQUIREMENTS HAVE DECREASED TO O2 @ 1. 5LPM RECEIVING 4/5 DOSE OF  remdesivir 100 mg in sodium chloride 0.9 % 250 mL IVPB          Subjective: Patient seen and examined at bedside.  Waiting on her phone.  Appears comfortable.  No acute complaints.       Objective:   Vitals: BP (!) 199/79 Comment: RN notified  Pulse 58   Temp 96.7 °F (35.9 °C) (Temporal)   Resp 18   Ht 5' 4\" (1.626 m)   Wt 155 lb 2 oz (70.4 kg)   SpO2 91%   BMI 26.63 kg/m²       General appearance: Alert   HEENT: AT/NC   Lungs: no increased work of breathing, BLAE   Heart: RRR   Abdomen: BS+, soft, NT, ND   Extremities: Pulses+   Neurologic: Alert, gross motor function intact   Skin: warm       Assessment and Plan:   Principal Problem:     Acute hypoxemic respiratory failure due to severe acute respiratory syndrome coronavirus 2 (SARS-CoV-2) disease (HCC)   Active Problems:   [facility] (hypertension)     DM (diabetes mellitus) ([facility]     Former heavy cigarette smoker (20-39 per day)   Resolved Problems:     * No resolved hospital problems. *       COVID: Did not meet tocilizumab criteria.  Decadron.  Remdesivir day 4 of 5.  Bronchodilators.  Adjuvant therapy.       DM: Monitor BG and adjust medication as needed.       Hypertension: Monitor BP and adjust medication as needed.  Norvasc added today.       Supportive management.       Advance Directive: Full Code       DVT prophylaxis: Lovenox       Discharge planning: TBD-hopefully tomorrow if remains stable.       DC PLANNING:   Pt reports that she lives at home with her spouse. He is positive but doing well at home. She plans to [place] back [place].        She was not on home o2 pta. If needed at [place], she is agreeable to use [facility] as her provider. [facility]   Ph: [phone]   Fax: [phone]       She does not have a pulse ox for use in the home, one will be provided for her through the [facility].       She is not sure at this time if she is interested in home health, she would like to think about it.        She reports her medications as affordable and would like to use [facility] at [place].        She plans for her spouse to transport her home at [place]. Denies any further needs at this time.    Electronically signed by Reza Minor on 8/16/2021 at 9:37 AM         Viral Illness, Acute - Care Day 3 (8/15/2021) by Jim Higgins RN       Review Status Review Entered   Completed 8/18/2021 09:30      Criteria Review      Care Day: 3 Care Date: 8/15/2021 Level of Care: Telemetry    Guideline Day 3    Clinical Status    ( ) * Hemodynamic stability    ( ) * Afebrile or temperature acceptable for next level of care    ( ) * Tachypnea absent    ( ) * Hypoxemia absent    ( ) * Mental status at baseline    ( ) * Renal function at baseline, or stable and acceptable for next level of care    ( ) * Discharge plans and education understood    Activity ( ) * Ambulatory or acceptable for next level of care    Routes    ( ) * Oral hydration    ( ) * Oral medications or regimen acceptable for next level of care    ( ) * Oral diet or acceptable for next level of care    Interventions    ( ) * Isolation not indicated, or is performable at next level of care    Medications    ( ) * Antimicrobial medication absent or regimen established for next level of care    * Milestone   Additional Notes   CARE DAY 3      8/15/2021      REMAINS IN COVID UNIT REQUIRING O2 @ 2LPM RECEIVING remdesivir 100 mg in sodium chloride 0.9 % 250 mL IVPB    Dose: 100 mg   Freq: EVERY 24 HOURS Route: IV               CHIEF COMPLAINT shortness of breath, known covid       SUBJECTIVE:  Pt states she is feeling much better today. Reports shortness of breath has improved. States she has been able to get up the the chair today.           Objective:   VITALS:  BP (!) 156/66   Pulse 68   Temp 96.8 °F (36 °C) (Temporal)   Resp 18   Ht 5' 4\" (1.626 m)   Wt 154 lb 3 oz (69.9 kg)   SpO2 92%   BMI 26.47 kg/m²          Physical Exam   Constitutional:        Appearance: Normal appearance. Eyes:       Extraocular Movements: Extraocular movements intact.       Pupils: Pupils are equal, round, and reactive to light. Cardiovascular:       Rate and Rhythm: Normal rate and regular rhythm.       Pulses: Normal pulses.       Heart sounds: Normal heart sounds. Pulmonary:       Effort: Pulmonary effort is normal.       Breath sounds: No wheezing. Abdominal:       General: Abdomen is flat. Bowel sounds are normal. There is no distension.       Palpations: Abdomen is soft.       Tenderness: There is no abdominal tenderness. Musculoskeletal:          General: Normal range of motion.       Cervical back: Normal range of motion and neck supple.       Right lower leg: No edema.       Left lower leg: No edema.     Skin:      General: Skin is warm and dry.       Capillary Refill: Capillary refill takes less than 2 seconds. Neurological:       General: No focal deficit present.       Mental Status: She is alert and oriented to person, place, and time.        · sodium chloride 125 mL/hr at 08/15/21 0916   · dextrose    · sodium chloride           Scheduled Medications   · sodium polystyrene 15 g Oral Q6H   · cefTRIAXone (ROCEPHIN) IV 1,000 mg Intravenous Q24H   · insulin glargine 15 Units Subcutaneous Nightly   · insulin lispro 5 Units Subcutaneous TID WC   · lisinopril 10 mg Oral Daily   · insulin lispro 0-6 Units Subcutaneous TID WC   · insulin lispro 0-3 Units Subcutaneous Nightly   · sodium chloride flush 5-40 mL Intravenous 2 times per day   · enoxaparin 30 mg Subcutaneous BID   · dexamethasone 6 mg Oral Daily   · Vitamin D 2,000 Units Oral Daily   · pantoprazole 40 mg Oral QAM AC   · ascorbic acid 500 mg Oral Daily   · budesonide-formoterol 2 puff Inhalation BID   · famotidine 20 mg Oral BID   · melatonin 5 mg Oral Nightly   · zinc sulfate 50 mg Oral Daily   · remdesivir IVPB 100 mg Intravenous Q24H         WBC 4.2   HGB 12.6         K 5.4   ALT 53   AST 54      Assessment/Plan   Principal Problem:     Acute hypoxemic respiratory failure due to severe acute respiratory syndrome coronavirus 2 (SARS-CoV-2) disease ([facility]   - Covid adjuvant therapy with zinc, vitamin C, vitamin D, Pepcid, melatonin               - Decadron 6 mg p.o. daily for 10 days                - Robitussin as needed               - Bronchodilators               - Incentive spirometry               - Encourage deep breathing and cough               - Supplemental oxygen as needed               - Droplet plus isolation               - remdesivir therapy day 3/5               -continue supportive care                      -home oxygen evaluation before discharge               -Lovenox        Active Problems:     HTN (hypertension)               -monitor, resumed home medications         DM (diabetes mellitus) ([facility]               -accus             -increased basal insulin               -SSI               -hypoglycemia protocol               -hold oral agents at this time         Former heavy cigarette smoker (20-39 per day)               -noted           DVT Prophylaxis: Lovenox

## 2021-08-20 ENCOUNTER — CARE COORDINATION (OUTPATIENT)
Dept: CASE MANAGEMENT | Age: 61
End: 2021-08-20

## 2021-08-20 NOTE — CARE COORDINATION
[facility] Transitions Initial Follow Up Call    Call within 2 business days of discharge: Yes    Patient: Aneta Helms Patient : 1960   MRN: [id number]  Reason for Admission:   Discharge Date: 21 RARS: Readmission Risk Score: 15      Last Discharge [facility]       Complaint Diagnosis Description Type Department Provider    21 Positive For Covid-19; Shortness of Breath Hypoxia . .. ED to Hosp-Admission (Discharged) (ADMITTED) [facility] Keke Swan MD; Vishal Antoine. .. Spoke with: N/A    Facility: [place] [id number]    Non-face-to-face services provided:  Reviewed encounter information for continuity of care prior to follow up phone call - chart notes, consults, progress notes, test results, med list, appointments, AVS, other information. Care Transitions 24 Hour Call    Care Transitions Interventions         Follow Up ; Attempt #2 to make contact with Radha Ramírez for an initial follow up call post discharge from the hospital without success. Unable to leave a message regarding intent of call and call back information. Will discharge from CTN services at this time due to inability to make contact. No future appointments.     Ute Lawrence RN
